# Patient Record
Sex: FEMALE | Race: OTHER | NOT HISPANIC OR LATINO | URBAN - METROPOLITAN AREA
[De-identification: names, ages, dates, MRNs, and addresses within clinical notes are randomized per-mention and may not be internally consistent; named-entity substitution may affect disease eponyms.]

---

## 2017-04-18 ENCOUNTER — EMERGENCY (EMERGENCY)
Facility: HOSPITAL | Age: 42
LOS: 0 days | Discharge: HOME | End: 2017-04-19
Attending: EMERGENCY MEDICINE | Admitting: EMERGENCY MEDICINE
Payer: MEDICAID

## 2017-04-18 VITALS — WEIGHT: 190.04 LBS | HEIGHT: 64 IN

## 2017-04-18 LAB
APPEARANCE UR: (no result)
BASOPHILS # BLD AUTO: 0.1 K/UL — SIGNIFICANT CHANGE UP (ref 0–0.2)
BASOPHILS NFR BLD AUTO: 1 % — SIGNIFICANT CHANGE UP (ref 0–2)
BILIRUB UR-MCNC: NEGATIVE — SIGNIFICANT CHANGE UP
COLOR SPEC: YELLOW — SIGNIFICANT CHANGE UP
DIFF PNL FLD: (no result)
EOSINOPHIL # BLD AUTO: 0 K/UL — SIGNIFICANT CHANGE UP (ref 0–0.5)
EOSINOPHIL NFR BLD AUTO: 0.4 % — SIGNIFICANT CHANGE UP (ref 0–6)
GLUCOSE UR QL: NEGATIVE MG/DL — SIGNIFICANT CHANGE UP
HCT VFR BLD CALC: 37.7 % — SIGNIFICANT CHANGE UP (ref 34.5–45)
HGB BLD-MCNC: 11.5 G/DL — SIGNIFICANT CHANGE UP (ref 11.5–15.5)
KETONES UR-MCNC: (no result)
LEUKOCYTE ESTERASE UR-ACNC: (no result)
LYMPHOCYTES # BLD AUTO: 0.5 K/UL — LOW (ref 1–3.3)
LYMPHOCYTES # BLD AUTO: 5.5 % — LOW (ref 13–44)
MCHC RBC-ENTMCNC: 23.6 PG — LOW (ref 27–34)
MCHC RBC-ENTMCNC: 30.6 GM/DL — LOW (ref 32–36)
MCV RBC AUTO: 77.2 FL — LOW (ref 80–100)
MONOCYTES # BLD AUTO: 0.7 K/UL — SIGNIFICANT CHANGE UP (ref 0–0.9)
MONOCYTES NFR BLD AUTO: 8.1 % — SIGNIFICANT CHANGE UP (ref 2–14)
NEUTROPHILS # BLD AUTO: 7.4 K/UL — SIGNIFICANT CHANGE UP (ref 1.8–7.4)
NEUTROPHILS NFR BLD AUTO: 85.1 % — HIGH (ref 43–77)
NITRITE UR-MCNC: NEGATIVE — SIGNIFICANT CHANGE UP
PH UR: 6 — SIGNIFICANT CHANGE UP (ref 5–8)
PLATELET # BLD AUTO: 241 K/UL — SIGNIFICANT CHANGE UP (ref 150–400)
PROT UR-MCNC: 15 MG/DL
RBC # BLD: 4.88 M/UL — SIGNIFICANT CHANGE UP (ref 3.8–5.2)
RBC # FLD: 14.4 % — SIGNIFICANT CHANGE UP (ref 10.3–14.5)
SP GR SPEC: 1.02 — SIGNIFICANT CHANGE UP (ref 1.01–1.02)
UROBILINOGEN FLD QL: NEGATIVE MG/DL — SIGNIFICANT CHANGE UP
WBC # BLD: 8.7 K/UL — SIGNIFICANT CHANGE UP (ref 3.8–10.5)
WBC # FLD AUTO: 8.7 K/UL — SIGNIFICANT CHANGE UP (ref 3.8–10.5)

## 2017-04-18 PROCEDURE — 99284 EMERGENCY DEPT VISIT MOD MDM: CPT

## 2017-04-18 RX ORDER — ONDANSETRON 8 MG/1
4 TABLET, FILM COATED ORAL ONCE
Qty: 0 | Refills: 0 | Status: COMPLETED | OUTPATIENT
Start: 2017-04-18 | End: 2017-04-18

## 2017-04-18 RX ORDER — SODIUM CHLORIDE 9 MG/ML
1000 INJECTION INTRAMUSCULAR; INTRAVENOUS; SUBCUTANEOUS ONCE
Qty: 0 | Refills: 0 | Status: COMPLETED | OUTPATIENT
Start: 2017-04-18 | End: 2017-04-18

## 2017-04-18 RX ADMIN — Medication 10 MILLIGRAM(S): at 23:57

## 2017-04-18 RX ADMIN — ONDANSETRON 4 MILLIGRAM(S): 8 TABLET, FILM COATED ORAL at 23:57

## 2017-04-18 RX ADMIN — SODIUM CHLORIDE 3000 MILLILITER(S): 9 INJECTION INTRAMUSCULAR; INTRAVENOUS; SUBCUTANEOUS at 23:34

## 2017-04-18 NOTE — ED ADULT NURSE NOTE - CHIEF COMPLAINT QUOTE
epigastric pain associated with nausea, vomiting x5 times today. Denies cp,sob,ha,dz,diarrhea or urinary sx.

## 2017-04-18 NOTE — ED ADULT TRIAGE NOTE - CHIEF COMPLAINT QUOTE
epigastric pain associated with nausea, vomiting x5 times today epigastric pain associated with nausea, vomiting x5 times today. Denies cp,sob,ha,dz,diarrhea or urinary sx.

## 2017-04-18 NOTE — ED PROVIDER NOTE - OBJECTIVE STATEMENT
42 y/o female with no PMHx, PSHx of cholecystectomy presents to the ED c/o non-bilious vomiting and non-bloody diarrhea starting at 5pm today. +subjective fever. Also c/o HA and abd pain. Denies dysuria, vaginal discharge or bleeding. Denies neck pain. No chest pain or SOB. No urinary f/u/d. No back pain. No motor or sensory deficits. Denies drug use. No recent travel. No rash. No other acute issues symptoms or concerns.

## 2017-04-18 NOTE — ED PROVIDER NOTE - NS ED MD SCRIBE ATTENDING SCRIBE SECTIONS
HISTORY OF PRESENT ILLNESS/PAST MEDICAL/SURGICAL/SOCIAL HISTORY/PROGRESS NOTE/PHYSICAL EXAM/VITAL SIGNS( Pullset)/DISPOSITION/REVIEW OF SYSTEMS/RESULTS

## 2017-04-18 NOTE — ED PROVIDER NOTE - MEDICAL DECISION MAKING DETAILS
feeling much better appy talk given return for intractable abd pain fevers or unable tolerate po fluids pt and pts  agree to plan of care

## 2017-04-19 VITALS
SYSTOLIC BLOOD PRESSURE: 106 MMHG | HEART RATE: 76 BPM | RESPIRATION RATE: 18 BRPM | OXYGEN SATURATION: 100 % | DIASTOLIC BLOOD PRESSURE: 60 MMHG

## 2017-04-19 LAB
ALBUMIN SERPL ELPH-MCNC: 4 G/DL — SIGNIFICANT CHANGE UP (ref 3.3–5)
ALP SERPL-CCNC: 103 U/L — SIGNIFICANT CHANGE UP (ref 40–120)
ALT FLD-CCNC: 42 U/L — SIGNIFICANT CHANGE UP (ref 12–78)
ANION GAP SERPL CALC-SCNC: 9 MMOL/L — SIGNIFICANT CHANGE UP (ref 5–17)
AST SERPL-CCNC: 30 U/L — SIGNIFICANT CHANGE UP (ref 15–37)
BILIRUB SERPL-MCNC: 0.5 MG/DL — SIGNIFICANT CHANGE UP (ref 0.2–1.2)
BUN SERPL-MCNC: 10 MG/DL — SIGNIFICANT CHANGE UP (ref 7–23)
CALCIUM SERPL-MCNC: 8.6 MG/DL — SIGNIFICANT CHANGE UP (ref 8.5–10.1)
CHLORIDE SERPL-SCNC: 103 MMOL/L — SIGNIFICANT CHANGE UP (ref 96–108)
CO2 SERPL-SCNC: 24 MMOL/L — SIGNIFICANT CHANGE UP (ref 22–31)
CREAT SERPL-MCNC: 0.7 MG/DL — SIGNIFICANT CHANGE UP (ref 0.5–1.3)
GLUCOSE SERPL-MCNC: 136 MG/DL — HIGH (ref 70–99)
LIDOCAIN IGE QN: 148 U/L — SIGNIFICANT CHANGE UP (ref 73–393)
POTASSIUM SERPL-MCNC: 3.7 MMOL/L — SIGNIFICANT CHANGE UP (ref 3.5–5.3)
POTASSIUM SERPL-SCNC: 3.7 MMOL/L — SIGNIFICANT CHANGE UP (ref 3.5–5.3)
PROT SERPL-MCNC: 8.5 GM/DL — HIGH (ref 6–8.3)
SODIUM SERPL-SCNC: 136 MMOL/L — SIGNIFICANT CHANGE UP (ref 135–145)

## 2017-04-19 NOTE — ED ADULT NURSE REASSESSMENT NOTE - NS ED NURSE REASSESS COMMENT FT1
POC PREGNANCY NEGATIVE TEST #1010
pt reports improvement to abd pain, rates abd pain upon discharge 3/10.

## 2017-04-20 DIAGNOSIS — R11.10 VOMITING, UNSPECIFIED: ICD-10-CM

## 2017-04-20 DIAGNOSIS — R50.9 FEVER, UNSPECIFIED: ICD-10-CM

## 2017-04-20 DIAGNOSIS — R10.9 UNSPECIFIED ABDOMINAL PAIN: ICD-10-CM

## 2017-10-08 ENCOUNTER — EMERGENCY (EMERGENCY)
Facility: HOSPITAL | Age: 42
LOS: 0 days | Discharge: HOME | End: 2017-10-09
Attending: EMERGENCY MEDICINE | Admitting: EMERGENCY MEDICINE
Payer: MEDICAID

## 2017-10-08 VITALS — WEIGHT: 199.96 LBS | HEIGHT: 64 IN

## 2017-10-08 DIAGNOSIS — K59.00 CONSTIPATION, UNSPECIFIED: ICD-10-CM

## 2017-10-08 DIAGNOSIS — R07.81 PLEURODYNIA: ICD-10-CM

## 2017-10-08 DIAGNOSIS — R10.12 LEFT UPPER QUADRANT PAIN: ICD-10-CM

## 2017-10-08 LAB
APPEARANCE UR: CLEAR — SIGNIFICANT CHANGE UP
BACTERIA # UR AUTO: (no result)
BILIRUB UR-MCNC: NEGATIVE — SIGNIFICANT CHANGE UP
COLOR SPEC: YELLOW — SIGNIFICANT CHANGE UP
DIFF PNL FLD: (no result)
EPI CELLS # UR: SIGNIFICANT CHANGE UP
GLUCOSE UR QL: NEGATIVE MG/DL — SIGNIFICANT CHANGE UP
HCT VFR BLD CALC: 33.8 % — LOW (ref 34.5–45)
HGB BLD-MCNC: 10.8 G/DL — LOW (ref 11.5–15.5)
KETONES UR-MCNC: NEGATIVE — SIGNIFICANT CHANGE UP
LEUKOCYTE ESTERASE UR-ACNC: (no result)
MCHC RBC-ENTMCNC: 24.2 PG — LOW (ref 27–34)
MCHC RBC-ENTMCNC: 32.1 GM/DL — SIGNIFICANT CHANGE UP (ref 32–36)
MCV RBC AUTO: 75.4 FL — LOW (ref 80–100)
NITRITE UR-MCNC: NEGATIVE — SIGNIFICANT CHANGE UP
PH UR: 5 — SIGNIFICANT CHANGE UP (ref 5–8)
PLATELET # BLD AUTO: 238 K/UL — SIGNIFICANT CHANGE UP (ref 150–400)
PROT UR-MCNC: NEGATIVE MG/DL — SIGNIFICANT CHANGE UP
RBC # BLD: 4.48 M/UL — SIGNIFICANT CHANGE UP (ref 3.8–5.2)
RBC # FLD: 14.5 % — SIGNIFICANT CHANGE UP (ref 10.3–14.5)
RBC CASTS # UR COMP ASSIST: NEGATIVE /HPF — SIGNIFICANT CHANGE UP (ref 0–4)
SP GR SPEC: 1.02 — SIGNIFICANT CHANGE UP (ref 1.01–1.02)
UROBILINOGEN FLD QL: NEGATIVE MG/DL — SIGNIFICANT CHANGE UP
WBC # BLD: 9.8 K/UL — SIGNIFICANT CHANGE UP (ref 3.8–10.5)
WBC # FLD AUTO: 9.8 K/UL — SIGNIFICANT CHANGE UP (ref 3.8–10.5)
WBC UR QL: SIGNIFICANT CHANGE UP

## 2017-10-08 PROCEDURE — 99284 EMERGENCY DEPT VISIT MOD MDM: CPT

## 2017-10-08 RX ORDER — KETOROLAC TROMETHAMINE 30 MG/ML
30 SYRINGE (ML) INJECTION ONCE
Qty: 0 | Refills: 0 | Status: DISCONTINUED | OUTPATIENT
Start: 2017-10-08 | End: 2017-10-08

## 2017-10-08 RX ORDER — SODIUM CHLORIDE 9 MG/ML
1000 INJECTION INTRAMUSCULAR; INTRAVENOUS; SUBCUTANEOUS ONCE
Qty: 0 | Refills: 0 | Status: COMPLETED | OUTPATIENT
Start: 2017-10-08 | End: 2017-10-08

## 2017-10-08 RX ORDER — SODIUM CHLORIDE 9 MG/ML
3 INJECTION INTRAMUSCULAR; INTRAVENOUS; SUBCUTANEOUS ONCE
Qty: 0 | Refills: 0 | Status: COMPLETED | OUTPATIENT
Start: 2017-10-08 | End: 2017-10-08

## 2017-10-08 RX ADMIN — SODIUM CHLORIDE 1000 MILLILITER(S): 9 INJECTION INTRAMUSCULAR; INTRAVENOUS; SUBCUTANEOUS at 23:20

## 2017-10-08 RX ADMIN — SODIUM CHLORIDE 3 MILLILITER(S): 9 INJECTION INTRAMUSCULAR; INTRAVENOUS; SUBCUTANEOUS at 23:40

## 2017-10-08 RX ADMIN — Medication 30 MILLIGRAM(S): at 23:25

## 2017-10-08 NOTE — ED ADULT NURSE NOTE - OBJECTIVE STATEMENT
Pt presents to ER c/o left sided rib/flank pain. Pt reports onset of symptoms was a few days ago, denies trauma/fall/long travel, denies CP/SOB. Skin on left flank is intact and non ecchymotic. Pt reports urine has been darker lately, denies blood in urine, denies painful urination. AO x 3 oriented to baseline, normal breathing pattern with no difficulty.

## 2017-10-08 NOTE — ED PROVIDER NOTE - MEDICAL DECISION MAKING DETAILS
Labs, UA and pain control with toradol planned.  If blood in urine, consider CT to r/o kidney stone.  If elevated WBC and evidence of UTI, will treat with antibiotics for upper urinary tract infection.

## 2017-10-08 NOTE — ED PROVIDER NOTE - OBJECTIVE STATEMENT
Pt. is a 41 yo F BIB family for left flank pain and LUQ abdominal pain X 2-3 days with worsened pain this evening.  Pt. states she feels like she has pain under left rib cage in LUQ of abdomen which radiates to left flank and lower abdomen.  States her urine is darker, but she denies dysuria or hematuria.  No injury or falls.  No heavy lifting.  Has had kidney infections in the past and has had a "flare up of her appendix" that went away with antibiotics.  Pt. states on chronic basis she intermittently gets RLQ abdominal pain that resolves on its own.  At this time, pt. denies any right sided abdominal pain, diarrhea, or fever.  No cough or trouble breathing.  +constipation

## 2017-10-09 VITALS
HEART RATE: 78 BPM | TEMPERATURE: 98 F | DIASTOLIC BLOOD PRESSURE: 87 MMHG | SYSTOLIC BLOOD PRESSURE: 121 MMHG | OXYGEN SATURATION: 100 % | RESPIRATION RATE: 17 BRPM

## 2017-10-09 LAB
ALBUMIN SERPL ELPH-MCNC: 3.4 G/DL — SIGNIFICANT CHANGE UP (ref 3.3–5)
ALP SERPL-CCNC: 102 U/L — SIGNIFICANT CHANGE UP (ref 40–120)
ALT FLD-CCNC: 27 U/L — SIGNIFICANT CHANGE UP (ref 12–78)
ANION GAP SERPL CALC-SCNC: 5 MMOL/L — SIGNIFICANT CHANGE UP (ref 5–17)
ANISOCYTOSIS BLD QL: SLIGHT — SIGNIFICANT CHANGE UP
AST SERPL-CCNC: 18 U/L — SIGNIFICANT CHANGE UP (ref 15–37)
BASOPHILS # BLD AUTO: 0.1 K/UL — SIGNIFICANT CHANGE UP (ref 0–0.2)
BASOPHILS NFR BLD AUTO: 1 % — SIGNIFICANT CHANGE UP (ref 0–2)
BILIRUB SERPL-MCNC: 0.2 MG/DL — SIGNIFICANT CHANGE UP (ref 0.2–1.2)
BUN SERPL-MCNC: 10 MG/DL — SIGNIFICANT CHANGE UP (ref 7–23)
CALCIUM SERPL-MCNC: 8.8 MG/DL — SIGNIFICANT CHANGE UP (ref 8.5–10.1)
CHLORIDE SERPL-SCNC: 105 MMOL/L — SIGNIFICANT CHANGE UP (ref 96–108)
CO2 SERPL-SCNC: 28 MMOL/L — SIGNIFICANT CHANGE UP (ref 22–31)
CREAT SERPL-MCNC: 0.66 MG/DL — SIGNIFICANT CHANGE UP (ref 0.5–1.3)
ELLIPTOCYTES BLD QL SMEAR: SLIGHT — SIGNIFICANT CHANGE UP
EOSINOPHIL # BLD AUTO: 0.3 K/UL — SIGNIFICANT CHANGE UP (ref 0–0.5)
EOSINOPHIL NFR BLD AUTO: 2.7 % — SIGNIFICANT CHANGE UP (ref 0–6)
GLUCOSE SERPL-MCNC: 104 MG/DL — HIGH (ref 70–99)
HYPOCHROMIA BLD QL: SLIGHT — SIGNIFICANT CHANGE UP
LIDOCAIN IGE QN: 149 U/L — SIGNIFICANT CHANGE UP (ref 73–393)
LYMPHOCYTES # BLD AUTO: 3 K/UL — SIGNIFICANT CHANGE UP (ref 1–3.3)
LYMPHOCYTES # BLD AUTO: 30.5 % — SIGNIFICANT CHANGE UP (ref 13–44)
MANUAL DIF COMMENT BLD-IMP: SIGNIFICANT CHANGE UP
MICROCYTES BLD QL: SLIGHT — SIGNIFICANT CHANGE UP
MONOCYTES # BLD AUTO: 0.7 K/UL — SIGNIFICANT CHANGE UP (ref 0–0.9)
MONOCYTES NFR BLD AUTO: 7.2 % — SIGNIFICANT CHANGE UP (ref 2–14)
NEUTROPHILS # BLD AUTO: 5.7 K/UL — SIGNIFICANT CHANGE UP (ref 1.8–7.4)
NEUTROPHILS NFR BLD AUTO: 58.7 % — SIGNIFICANT CHANGE UP (ref 43–77)
PLAT MORPH BLD: NORMAL — SIGNIFICANT CHANGE UP
POIKILOCYTOSIS BLD QL AUTO: SLIGHT — SIGNIFICANT CHANGE UP
POTASSIUM SERPL-MCNC: 3.7 MMOL/L — SIGNIFICANT CHANGE UP (ref 3.5–5.3)
POTASSIUM SERPL-SCNC: 3.7 MMOL/L — SIGNIFICANT CHANGE UP (ref 3.5–5.3)
PROT SERPL-MCNC: 7.4 GM/DL — SIGNIFICANT CHANGE UP (ref 6–8.3)
RBC BLD AUTO: (no result)
SODIUM SERPL-SCNC: 138 MMOL/L — SIGNIFICANT CHANGE UP (ref 135–145)

## 2017-10-09 PROCEDURE — 74177 CT ABD & PELVIS W/CONTRAST: CPT | Mod: 26

## 2017-10-09 NOTE — ED ADULT NURSE REASSESSMENT NOTE - NS ED NURSE REASSESS COMMENT FT1
Pt reports partial pain/pressure relief in left flank, resting comfortable in bed, head of bed elevated. Pt updated on plan of care.

## 2017-10-10 LAB
CULTURE RESULTS: SIGNIFICANT CHANGE UP
SPECIMEN SOURCE: SIGNIFICANT CHANGE UP

## 2018-10-22 ENCOUNTER — EMERGENCY (EMERGENCY)
Facility: HOSPITAL | Age: 43
LOS: 0 days | Discharge: HOME | End: 2018-10-22
Attending: STUDENT IN AN ORGANIZED HEALTH CARE EDUCATION/TRAINING PROGRAM | Admitting: STUDENT IN AN ORGANIZED HEALTH CARE EDUCATION/TRAINING PROGRAM
Payer: MEDICAID

## 2018-10-22 VITALS
DIASTOLIC BLOOD PRESSURE: 83 MMHG | HEART RATE: 70 BPM | SYSTOLIC BLOOD PRESSURE: 135 MMHG | RESPIRATION RATE: 16 BRPM | OXYGEN SATURATION: 100 % | TEMPERATURE: 98 F

## 2018-10-22 VITALS — WEIGHT: 192.9 LBS | HEIGHT: 61 IN

## 2018-10-22 LAB
ANION GAP SERPL CALC-SCNC: 7 MMOL/L — SIGNIFICANT CHANGE UP (ref 5–17)
BASOPHILS # BLD AUTO: 0.03 K/UL — SIGNIFICANT CHANGE UP (ref 0–0.2)
BASOPHILS NFR BLD AUTO: 0.3 % — SIGNIFICANT CHANGE UP (ref 0–2)
BUN SERPL-MCNC: 14 MG/DL — SIGNIFICANT CHANGE UP (ref 7–23)
CALCIUM SERPL-MCNC: 8.4 MG/DL — LOW (ref 8.5–10.1)
CHLORIDE SERPL-SCNC: 103 MMOL/L — SIGNIFICANT CHANGE UP (ref 96–108)
CO2 SERPL-SCNC: 26 MMOL/L — SIGNIFICANT CHANGE UP (ref 22–31)
CREAT SERPL-MCNC: 0.77 MG/DL — SIGNIFICANT CHANGE UP (ref 0.5–1.3)
EOSINOPHIL # BLD AUTO: 0.2 K/UL — SIGNIFICANT CHANGE UP (ref 0–0.5)
EOSINOPHIL NFR BLD AUTO: 2.2 % — SIGNIFICANT CHANGE UP (ref 0–6)
GLUCOSE SERPL-MCNC: 104 MG/DL — HIGH (ref 70–99)
HCT VFR BLD CALC: 32.6 % — LOW (ref 34.5–45)
HGB BLD-MCNC: 10 G/DL — LOW (ref 11.5–15.5)
IMM GRANULOCYTES NFR BLD AUTO: 0.3 % — SIGNIFICANT CHANGE UP (ref 0–1.5)
LYMPHOCYTES # BLD AUTO: 2.62 K/UL — SIGNIFICANT CHANGE UP (ref 1–3.3)
LYMPHOCYTES # BLD AUTO: 29 % — SIGNIFICANT CHANGE UP (ref 13–44)
MCHC RBC-ENTMCNC: 23.6 PG — LOW (ref 27–34)
MCHC RBC-ENTMCNC: 30.7 GM/DL — LOW (ref 32–36)
MCV RBC AUTO: 77.1 FL — LOW (ref 80–100)
MONOCYTES # BLD AUTO: 0.63 K/UL — SIGNIFICANT CHANGE UP (ref 0–0.9)
MONOCYTES NFR BLD AUTO: 7 % — SIGNIFICANT CHANGE UP (ref 2–14)
NEUTROPHILS # BLD AUTO: 5.53 K/UL — SIGNIFICANT CHANGE UP (ref 1.8–7.4)
NEUTROPHILS NFR BLD AUTO: 61.2 % — SIGNIFICANT CHANGE UP (ref 43–77)
NRBC # BLD: 0 /100 WBCS — SIGNIFICANT CHANGE UP (ref 0–0)
PLATELET # BLD AUTO: 273 K/UL — SIGNIFICANT CHANGE UP (ref 150–400)
POTASSIUM SERPL-MCNC: 3.5 MMOL/L — SIGNIFICANT CHANGE UP (ref 3.5–5.3)
POTASSIUM SERPL-SCNC: 3.5 MMOL/L — SIGNIFICANT CHANGE UP (ref 3.5–5.3)
RBC # BLD: 4.23 M/UL — SIGNIFICANT CHANGE UP (ref 3.8–5.2)
RBC # FLD: 15.8 % — HIGH (ref 10.3–14.5)
SODIUM SERPL-SCNC: 136 MMOL/L — SIGNIFICANT CHANGE UP (ref 135–145)
WBC # BLD: 9.04 K/UL — SIGNIFICANT CHANGE UP (ref 3.8–10.5)
WBC # FLD AUTO: 9.04 K/UL — SIGNIFICANT CHANGE UP (ref 3.8–10.5)

## 2018-10-22 PROCEDURE — 99283 EMERGENCY DEPT VISIT LOW MDM: CPT

## 2018-10-22 RX ORDER — ACETAMINOPHEN 500 MG
650 TABLET ORAL ONCE
Qty: 0 | Refills: 0 | Status: COMPLETED | OUTPATIENT
Start: 2018-10-22 | End: 2018-10-22

## 2018-10-22 RX ADMIN — Medication 650 MILLIGRAM(S): at 19:09

## 2018-10-22 NOTE — ED ADULT NURSE NOTE - NSIMPLEMENTINTERV_GEN_ALL_ED
Implemented All Universal Safety Interventions:  [place] to call system. Call bell, personal items and telephone within reach. Instruct patient to call for assistance. Room bathroom lighting operational. Non-slip footwear when patient is off stretcher. Physically safe environment: no spills, clutter or unnecessary equipment. Stretcher in lowest position, wheels locked, appropriate side rails in place.

## 2018-10-22 NOTE — ED STATDOCS - PROGRESS NOTE DETAILS
43 year old female, denies pmhx, here for bilateral hand pain, itching, dryness x 8 days. Patient works as a , states she uses chemicals and  everyday. She denies redness/ bleeding/ open wounds. PE: Bilateral hands appear mildly dry, no redness, warmth, or sign of infection  IMP: Dry hands/ Chemical exposure? Labs reviewed and unremarkable.  Advised avoiding contact with chemicals, OTC hand cream and pain control, Follow up PMD  Dwaine Rosenthal PA-C

## 2018-10-22 NOTE — ED STATDOCS - OBJECTIVE STATEMENT
44 y/o female with a PMHx of pyelonephritis presents to the ED c/o bilateral hand pain, numbness and itchiness x8 days. Pt reports she is experiencing the sx in all digits as well. Pt took 2 tablets of Ibuprofen today for the pain without relief. Pt works as a . No PCP.  #[id number]

## 2018-10-22 NOTE — ED STATDOCS - NEUROLOGICAL, MLM
sensation is normal and strength is normal. negative phalen's test,  ? positive tinel's test sensation is normal and strength is normal. ? positive tinel's test

## 2018-10-22 NOTE — ED ADULT TRIAGE NOTE - CHIEF COMPLAINT QUOTE
c/o bilateral hand pain, numbness and tingling for past 8 days, pt states she works as a  HX: denies

## 2018-10-22 NOTE — ED STATDOCS - ATTENDING CONTRIBUTION TO CARE
I, Frances Lopez DO,  performed the initial face to face bedside interview with this patient regarding history of present illness, review of symptoms and relevant past medical, social and family history.  I completed an independent physical examination.  I was the initial provider who evaluated this patient. I have signed out the follow up of any pending tests (i.e. labs, radiological studies) to the ACP.  I have communicated the patient’s plan of care and disposition with the ACP.  The history, relevant review of systems, past medical and surgical history, medical decision making, and physical examination was documented by the scribe in my presence and I attest to the accuracy of the documentation.

## 2018-10-22 NOTE — ED STATDOCS - MUSCULOSKELETAL, MLM
range of motion is not limited and there is no muscle tenderness. range of motion is not limited and there is no muscle tenderness. negative phalen's test

## 2018-10-23 PROBLEM — N12 TUBULO-INTERSTITIAL NEPHRITIS, NOT SPECIFIED AS ACUTE OR CHRONIC: Chronic | Status: ACTIVE | Noted: 2017-10-08

## 2018-10-24 DIAGNOSIS — M79.642 PAIN IN LEFT HAND: ICD-10-CM

## 2018-10-24 DIAGNOSIS — R20.0 ANESTHESIA OF SKIN: ICD-10-CM

## 2018-10-24 DIAGNOSIS — M79.641 PAIN IN RIGHT HAND: ICD-10-CM

## 2018-10-24 DIAGNOSIS — Z87.19 PERSONAL HISTORY OF OTHER DISEASES OF THE DIGESTIVE SYSTEM: ICD-10-CM

## 2020-04-12 NOTE — ED ADULT NURSE NOTE - PATIENT DISCHARGE SIGNATURE
0800 Received call from Raissa Chiang at [facility], states they are waiting for their medical director to approve patient for hospice, will return call when they have decision. 1045 Received call from Raissa Chiang at [facility], they have approval for patient. Transportation requested per [facility], state they will be here in 15 minutes to pick patient up. Updated family at bedside and pt's RN of  time. Rn given number to call report [phone]. Faxed hospice order to Raissa Chiang at [facility] and notified her of  time. 22-Oct-2018

## 2020-11-30 ENCOUNTER — EMERGENCY (EMERGENCY)
Facility: HOSPITAL | Age: 45
LOS: 0 days | Discharge: HOME | End: 2020-11-30
Attending: EMERGENCY MEDICINE
Payer: MEDICAID

## 2020-11-30 VITALS
DIASTOLIC BLOOD PRESSURE: 52 MMHG | TEMPERATURE: 99 F | HEART RATE: 78 BPM | OXYGEN SATURATION: 100 % | RESPIRATION RATE: 19 BRPM | SYSTOLIC BLOOD PRESSURE: 113 MMHG

## 2020-11-30 VITALS — HEIGHT: 61 IN | WEIGHT: 160.06 LBS

## 2020-11-30 DIAGNOSIS — Z87.440 PERSONAL HISTORY OF URINARY (TRACT) INFECTIONS: ICD-10-CM

## 2020-11-30 DIAGNOSIS — R10.9 UNSPECIFIED ABDOMINAL PAIN: ICD-10-CM

## 2020-11-30 LAB
ALBUMIN SERPL ELPH-MCNC: 3.3 G/DL — SIGNIFICANT CHANGE UP (ref 3.3–5)
ALP SERPL-CCNC: 68 U/L — SIGNIFICANT CHANGE UP (ref 40–120)
ALT FLD-CCNC: 19 U/L — SIGNIFICANT CHANGE UP (ref 12–78)
ANION GAP SERPL CALC-SCNC: 3 MMOL/L — LOW (ref 5–17)
APPEARANCE UR: ABNORMAL
AST SERPL-CCNC: 16 U/L — SIGNIFICANT CHANGE UP (ref 15–37)
BASOPHILS # BLD AUTO: 0.03 K/UL — SIGNIFICANT CHANGE UP (ref 0–0.2)
BASOPHILS NFR BLD AUTO: 0.6 % — SIGNIFICANT CHANGE UP (ref 0–2)
BILIRUB SERPL-MCNC: 0.3 MG/DL — SIGNIFICANT CHANGE UP (ref 0.2–1.2)
BILIRUB UR-MCNC: NEGATIVE — SIGNIFICANT CHANGE UP
BUN SERPL-MCNC: 6 MG/DL — LOW (ref 7–23)
CALCIUM SERPL-MCNC: 8.9 MG/DL — SIGNIFICANT CHANGE UP (ref 8.5–10.1)
CHLORIDE SERPL-SCNC: 108 MMOL/L — SIGNIFICANT CHANGE UP (ref 96–108)
CO2 SERPL-SCNC: 27 MMOL/L — SIGNIFICANT CHANGE UP (ref 22–31)
COLOR SPEC: YELLOW — SIGNIFICANT CHANGE UP
CREAT SERPL-MCNC: 0.53 MG/DL — SIGNIFICANT CHANGE UP (ref 0.5–1.3)
DIFF PNL FLD: ABNORMAL
EOSINOPHIL # BLD AUTO: 0.1 K/UL — SIGNIFICANT CHANGE UP (ref 0–0.5)
EOSINOPHIL NFR BLD AUTO: 2 % — SIGNIFICANT CHANGE UP (ref 0–6)
GLUCOSE SERPL-MCNC: 91 MG/DL — SIGNIFICANT CHANGE UP (ref 70–99)
GLUCOSE UR QL: NEGATIVE MG/DL — SIGNIFICANT CHANGE UP
HCT VFR BLD CALC: 30.6 % — LOW (ref 34.5–45)
HGB BLD-MCNC: 8.8 G/DL — LOW (ref 11.5–15.5)
IMM GRANULOCYTES NFR BLD AUTO: 0.4 % — SIGNIFICANT CHANGE UP (ref 0–1.5)
KETONES UR-MCNC: ABNORMAL
LEUKOCYTE ESTERASE UR-ACNC: ABNORMAL
LIDOCAIN IGE QN: 106 U/L — SIGNIFICANT CHANGE UP (ref 73–393)
LYMPHOCYTES # BLD AUTO: 0.99 K/UL — LOW (ref 1–3.3)
LYMPHOCYTES # BLD AUTO: 20.2 % — SIGNIFICANT CHANGE UP (ref 13–44)
MCHC RBC-ENTMCNC: 21.1 PG — LOW (ref 27–34)
MCHC RBC-ENTMCNC: 28.8 GM/DL — LOW (ref 32–36)
MCV RBC AUTO: 73.2 FL — LOW (ref 80–100)
MONOCYTES # BLD AUTO: 0.62 K/UL — SIGNIFICANT CHANGE UP (ref 0–0.9)
MONOCYTES NFR BLD AUTO: 12.6 % — SIGNIFICANT CHANGE UP (ref 2–14)
NEUTROPHILS # BLD AUTO: 3.15 K/UL — SIGNIFICANT CHANGE UP (ref 1.8–7.4)
NEUTROPHILS NFR BLD AUTO: 64.2 % — SIGNIFICANT CHANGE UP (ref 43–77)
NITRITE UR-MCNC: NEGATIVE — SIGNIFICANT CHANGE UP
PH UR: 6 — SIGNIFICANT CHANGE UP (ref 5–8)
PLATELET # BLD AUTO: 235 K/UL — SIGNIFICANT CHANGE UP (ref 150–400)
POTASSIUM SERPL-MCNC: 4 MMOL/L — SIGNIFICANT CHANGE UP (ref 3.5–5.3)
POTASSIUM SERPL-SCNC: 4 MMOL/L — SIGNIFICANT CHANGE UP (ref 3.5–5.3)
PROT SERPL-MCNC: 7.6 GM/DL — SIGNIFICANT CHANGE UP (ref 6–8.3)
PROT UR-MCNC: 30 MG/DL
RBC # BLD: 4.18 M/UL — SIGNIFICANT CHANGE UP (ref 3.8–5.2)
RBC # FLD: 17.2 % — HIGH (ref 10.3–14.5)
SODIUM SERPL-SCNC: 138 MMOL/L — SIGNIFICANT CHANGE UP (ref 135–145)
SP GR SPEC: 1.02 — SIGNIFICANT CHANGE UP (ref 1.01–1.02)
UROBILINOGEN FLD QL: 1 MG/DL
WBC # BLD: 4.91 K/UL — SIGNIFICANT CHANGE UP (ref 3.8–10.5)
WBC # FLD AUTO: 4.91 K/UL — SIGNIFICANT CHANGE UP (ref 3.8–10.5)

## 2020-11-30 PROCEDURE — 99284 EMERGENCY DEPT VISIT MOD MDM: CPT

## 2020-11-30 PROCEDURE — 36415 COLL VENOUS BLD VENIPUNCTURE: CPT

## 2020-11-30 PROCEDURE — 74176 CT ABD & PELVIS W/O CONTRAST: CPT

## 2020-11-30 PROCEDURE — 85025 COMPLETE CBC W/AUTO DIFF WBC: CPT

## 2020-11-30 PROCEDURE — 83690 ASSAY OF LIPASE: CPT

## 2020-11-30 PROCEDURE — 99284 EMERGENCY DEPT VISIT MOD MDM: CPT | Mod: 25

## 2020-11-30 PROCEDURE — 96374 THER/PROPH/DIAG INJ IV PUSH: CPT

## 2020-11-30 PROCEDURE — 87086 URINE CULTURE/COLONY COUNT: CPT

## 2020-11-30 PROCEDURE — 87186 SC STD MICRODIL/AGAR DIL: CPT

## 2020-11-30 PROCEDURE — 74176 CT ABD & PELVIS W/O CONTRAST: CPT | Mod: 26

## 2020-11-30 PROCEDURE — 80053 COMPREHEN METABOLIC PANEL: CPT

## 2020-11-30 PROCEDURE — 81001 URINALYSIS AUTO W/SCOPE: CPT

## 2020-11-30 RX ORDER — CEFTRIAXONE 500 MG/1
1000 INJECTION, POWDER, FOR SOLUTION INTRAMUSCULAR; INTRAVENOUS ONCE
Refills: 0 | Status: DISCONTINUED | OUTPATIENT
Start: 2020-11-30 | End: 2020-11-30

## 2020-11-30 RX ORDER — ACETAMINOPHEN 500 MG
650 TABLET ORAL ONCE
Refills: 0 | Status: COMPLETED | OUTPATIENT
Start: 2020-11-30 | End: 2020-11-30

## 2020-11-30 RX ORDER — CEPHALEXIN 500 MG
1 CAPSULE ORAL
Qty: 42 | Refills: 0
Start: 2020-11-30 | End: 2020-12-13

## 2020-11-30 RX ORDER — SODIUM CHLORIDE 9 MG/ML
1000 INJECTION INTRAMUSCULAR; INTRAVENOUS; SUBCUTANEOUS ONCE
Refills: 0 | Status: COMPLETED | OUTPATIENT
Start: 2020-11-30 | End: 2020-11-30

## 2020-11-30 RX ORDER — CEFTRIAXONE 500 MG/1
1000 INJECTION, POWDER, FOR SOLUTION INTRAMUSCULAR; INTRAVENOUS ONCE
Refills: 0 | Status: COMPLETED | OUTPATIENT
Start: 2020-11-30 | End: 2020-11-30

## 2020-11-30 RX ADMIN — SODIUM CHLORIDE 1000 MILLILITER(S): 9 INJECTION INTRAMUSCULAR; INTRAVENOUS; SUBCUTANEOUS at 14:18

## 2020-11-30 RX ADMIN — Medication 650 MILLIGRAM(S): at 14:25

## 2020-11-30 RX ADMIN — CEFTRIAXONE 1000 MILLIGRAM(S): 500 INJECTION, POWDER, FOR SOLUTION INTRAMUSCULAR; INTRAVENOUS at 14:25

## 2020-11-30 RX ADMIN — SODIUM CHLORIDE 1000 MILLILITER(S): 9 INJECTION INTRAMUSCULAR; INTRAVENOUS; SUBCUTANEOUS at 13:52

## 2020-11-30 NOTE — ED STATDOCS - OBJECTIVE STATEMENT
44 y/o female with PMHx of Pyelonephritis presents to the ED c/o changes in urinary frequency and left flank pain since 11/28, worsening. Pt states she has now developed dysuria, states she has trouble urinating due to pain, and fever. Pt notes history of UTIs. Denies chance of pregnancy. No other complaints at this time.

## 2020-11-30 NOTE — ED STATDOCS - PROGRESS NOTE DETAILS
Using  [id number], 46 yo female with no significant PMH and PSH of b/l tubal ligation presents for L flank pain, frequency of urinary and dysuria since Saturday. Pt noticed sub fever at home and chills. Tylenol was taken for her symptoms. Pt states it feels similar to her past UTIs in the past. -Dwaine Alvarado PA-C Using  [id number], discussed the results with pt and advised her to f/u with her pmd for the anemia. Pt is aware and agrees to f/u. ALso discussed the UA and CT which showed signs of a UTI. Due to the L flank pain will treat with keflex for 2 weeks for possible pyleo. -Dwaine Alvarado PA-C

## 2020-11-30 NOTE — ED ADULT TRIAGE NOTE - PAIN RATING/NUMBER SCALE (0-10): ACTIVITY
Problem: Goal Outcome Summary  Goal: Goal Outcome Summary  Outcome: No Change  Infant stabilized on radiant warmer. Remains on CPAP peep of 6, FIO2 21-24%. Fussy on/off. Starter TPN infusing in UVC at 8ml/hr. Antibiotics initiated. UVC measures 6 at umbilicus. Some oozing noted under dressing....dressing replaced with new dressing, no new oozing noted. Infant has intermittent grunting. Voided, no stool.       8

## 2020-11-30 NOTE — ED ADULT TRIAGE NOTE - CHIEF COMPLAINT QUOTE
pt presents to ED with complaints of b/l flank pain radiating to front. pt states symptoms started yesterday. pt took tylenol last night with no relief.

## 2020-11-30 NOTE — ED STATDOCS - PATIENT PORTAL LINK FT
You can access the FollowMyHealth Patient Portal offered by [facility] by registering at the following website: [email or web address]. By joining [email or web address]’s FollowMyHealth portal, you will also be able to view your health information using other applications (apps) compatible with our system.

## 2020-11-30 NOTE — ED STATDOCS - GASTROINTESTINAL, MLM
abdomen soft, and non-distended. Bowel sounds present. +left abd tenderness, left suprapubic TTP, left CVAT, TTP.

## 2020-11-30 NOTE — ED ADULT NURSE NOTE - OBJECTIVE STATEMENT
Pt c/o BL flank pain with radiation to lower pelvis with burning with urination x days.  Pt c/o subjective fever at home, afebrile in ER.

## 2020-11-30 NOTE — ED STATDOCS - NSFOLLOWUPINSTRUCTIONS_ED_ALL_ED_FT
Infección urinaria en los adultos    Urinary Tract Infection, Adult    Tosha infección urinaria (IU) puede ocurrir en cualquier lugar de las vías urinarias. Las vías urinarias incluyen lo siguiente:  •Los riñones.      •Los uréteres.      •La vejiga.      •La uretra.      Estos órganos fabrican, almacenan y eliminan el pis (orina) del cuerpo.      ¿Cuáles son las causas?    La causa es la presencia de gérmenes (bacterias) en la freddy genital. Estos gérmenes proliferan y causan hinchazón (inflamación) de las vías urinarias.      ¿Qué incrementa el riesgo?  Es más probable que contraiga esta afección si:  •Tiene colocado un tubo lizarraga y pequeño (catéter) para drenar el pis.      •No puede controlar la evacuación de pis ni de materia fecal (incontinencia).    •Es oksana y, además:•Usa estos métodos para evitar el embarazo:  •Un medicamento que maza los espermatozoides (espermicida).      •Un dispositivo que impide el paso de los espermatozoides (diafragma).        •Tiene niveles bajos de tosha hormona femenina (estrógeno).      •Está embarazada.        •Tiene genes que aumentan banegas riesgo.      •Es sexualmente activa.      •Monika antibióticos.     •Tiene dificultad para orinar debido a:  •Banegas próstata es más jack de lo normal, si usted es hombre.      •Obstrucción en la parte del cuerpo que drena el pis de la vejiga (uretra).      •Cálculo renal.       •Un trastorno nervioso que afecta la vejiga (vejiga neurógena).      •No ariana tosha cantidad suficiente de líquido.       •No hace pis con la frecuencia suficiente.      •Tiene otras afecciones, lindsay:  •Diabetes.       •Un sistema que combate las enfermedades (sistema inmunitario) debilitado.      •Anemia drepanocítica.       •Gota.       •Lesión en la columna vertebral.          ¿Cuáles son los signos o los síntomas?  Los síntomas de esta afección incluyen:  •Necesidad inmediata (urgente) de hacer pis.      •Hacer pis con frecuencia.      •Hacer poca cantidad de pis con mucha frecuencia.      •Dolor o ardor al hacer pis.      •Shayne en el pis.      •Pis que huele mal o anormal.      •Dificultad para hacer pis.      •Pis turbio.      •Líquido que sale de la vagina, si es oksana.      •Dolor en la odette o en la parte baja de la espalda.    Otros síntomas pueden incluir los siguientes:  •Vómitos.      •No sentir deseos de comer.      •Sentirse confundido (confuso).      •Sentirse cansado y malhumorado (irritable).      •Fiebre.      •Materia fecal líquida (diarrea).        ¿Cómo se trata?  El tratamiento de esta afección puede incluir:  •Antibióticos.       •Otros medicamentos.      •Beber tosha cantidad suficiente de agua.        Sigue estas instrucciones en tu casa:     Medicamentos     •[place] los medicamentos de venta chitra y los recetados solamente lindsay se lo haya indicado el médico.      •Si le recetaron un antibiótico, tómelo lindsay se lo haya indicado el médico. No deje de tomarlo aunque comience a sentirse mejor.      Indicaciones generales   •Asegúrese de hacer lo siguiente:  •Khadra pis hasta que la vejiga quede vacía.       •No contenga el pis bushra mucho tiempo.      •Vacíe la vejiga después de tener relaciones sexuales.      •Límpiese de adelante hacia atrás después de defecar, si es oksana. Use cada trozo de papel higiénico solo tosha vez cuando se limpie.        •Patricia suficiente líquido lindsay para mantener la orina de color amarillo pálido.      •Concurra a todas las visitas de seguimiento lindsay se lo haya indicado el médico. [place] es importante.        Comuníquese con un médico si:    •No mejora después de 1 o 2 días de tratamiento.      •Los síntomas desaparecen y luego reaparecen.        Solicite ayuda inmediatamente si:    •Tiene un dolor muy intenso en la espalda.      •Tiene dolor muy intenso en la parte baja de la odette.      •Tener fiebre.      •Tiene malestar estomacal (náuseas).      •Tiene vómitos.        Resumen    •Tosha infección urinaria (IU) puede ocurrir en cualquier lugar de las vías urinarias.      •Esta afección es causada por la presencia de gérmenes en la freddy genital.      •Existen muchos factores de riesgo de sufrir tosha IU. Estos incluyen tener colocado un tubo lizarraga y pequeño para drenar el pis y no poder controlar cuándo hace pis y materia fecal.      •El tratamiento incluye antibióticos contra los gérmenes.      •Patricia suficiente líquido lindsay para mantener la orina de color amarillo pálido.      Esta información no tiene lindsay fin reemplazar el consejo del médico. Asegúrese de hacerle al médico cualquier pregunta que tenga.

## 2020-12-03 NOTE — ED POST DISCHARGE NOTE - RESULT SUMMARY
Urine culture + for E. coli 50K-99K CFU. Discharged home with keflex to which bacteria is sensitive. - ANASTASIA LarkinC

## 2021-01-01 NOTE — ED ADULT TRIAGE NOTE - CADM TRG TX PRIOR TO ARRIVAL
none [place] affected by asymmetric IUGR [place] affected by asymmetric IUGR [place] affected by asymmetric IUGR

## 2021-06-17 ENCOUNTER — APPOINTMENT (OUTPATIENT)
Dept: MAMMOGRAPHY | Facility: CLINIC | Age: 46
End: 2021-06-17
Payer: MEDICAID

## 2021-06-17 ENCOUNTER — OUTPATIENT (OUTPATIENT)
Dept: OUTPATIENT SERVICES | Facility: HOSPITAL | Age: 46
LOS: 1 days | End: 2021-06-17
Payer: MEDICAID

## 2021-06-17 PROCEDURE — 77063 BREAST TOMOSYNTHESIS BI: CPT

## 2021-06-17 PROCEDURE — 77063 BREAST TOMOSYNTHESIS BI: CPT | Mod: 26

## 2021-06-17 PROCEDURE — 77067 SCR MAMMO BI INCL CAD: CPT

## 2021-06-17 PROCEDURE — 77067 SCR MAMMO BI INCL CAD: CPT | Mod: 26

## 2021-08-04 ENCOUNTER — RESULT REVIEW (OUTPATIENT)
Age: 46
End: 2021-08-04

## 2021-08-10 ENCOUNTER — APPOINTMENT (OUTPATIENT)
Dept: ULTRASOUND IMAGING | Facility: CLINIC | Age: 46
End: 2021-08-10
Payer: MEDICAID

## 2021-08-10 ENCOUNTER — OUTPATIENT (OUTPATIENT)
Dept: OUTPATIENT SERVICES | Facility: HOSPITAL | Age: 46
LOS: 1 days | End: 2021-08-10
Payer: COMMERCIAL

## 2021-08-10 DIAGNOSIS — R10.2 PELVIC AND PERINEAL PAIN: ICD-10-CM

## 2021-08-10 DIAGNOSIS — Z01.411 ENCOUNTER FOR GYNECOLOGICAL EXAMINATION (GENERAL) (ROUTINE) WITH ABNORMAL FINDINGS: ICD-10-CM

## 2021-08-10 PROCEDURE — 76830 TRANSVAGINAL US NON-OB: CPT

## 2021-08-10 PROCEDURE — 76830 TRANSVAGINAL US NON-OB: CPT | Mod: 26

## 2021-08-10 PROCEDURE — 76856 US EXAM PELVIC COMPLETE: CPT

## 2021-08-10 PROCEDURE — 76856 US EXAM PELVIC COMPLETE: CPT | Mod: 26

## 2021-08-23 ENCOUNTER — APPOINTMENT (OUTPATIENT)
Dept: UROLOGY | Facility: CLINIC | Age: 46
End: 2021-08-23
Payer: MEDICAID

## 2021-08-23 VITALS
TEMPERATURE: 97.8 F | DIASTOLIC BLOOD PRESSURE: 78 MMHG | WEIGHT: 185.8 LBS | OXYGEN SATURATION: 98 % | HEART RATE: 75 BPM | HEIGHT: 61 IN | SYSTOLIC BLOOD PRESSURE: 113 MMHG | BODY MASS INDEX: 35.08 KG/M2

## 2021-08-23 PROCEDURE — 99203 OFFICE O/P NEW LOW 30 MIN: CPT

## 2021-08-23 NOTE — HISTORY OF PRESENT ILLNESS
[FreeTextEntry1] : This patient presents for the evaluation of urinary urgency incontinence noted over the past year.  She notes this seems to be worsening somewhat.

## 2021-08-23 NOTE — REVIEW OF SYSTEMS
[Feeling Tired] : feeling tired [Dry Eyes] : dryness of the eyes [Eyes Itch] : itching of the eyes [denies] : denies pain with orgasm [see HPI] : see HPI [Negative] : Eyes

## 2021-08-23 NOTE — END OF VISIT
[FreeTextEntry3] : She will trial anticholinergic medications and urine is sent for culture analysis and cytology.  She will follow-up with a sonogram of the kidneys and bladder and cystometric's and cystoscopy with further plans to follow

## 2021-08-24 LAB
APPEARANCE: CLEAR
BACTERIA: NEGATIVE
BILIRUBIN URINE: NEGATIVE
BLOOD URINE: NEGATIVE
CALCIUM OXALATE CRYSTALS: ABNORMAL
COLOR: NORMAL
GLUCOSE QUALITATIVE U: NEGATIVE
HYALINE CASTS: 1 /LPF
KETONES URINE: NEGATIVE
LEUKOCYTE ESTERASE URINE: NEGATIVE
MICROSCOPIC-UA: NORMAL
NITRITE URINE: NEGATIVE
PH URINE: 7
PROTEIN URINE: NORMAL
RED BLOOD CELLS URINE: 2 /HPF
SPECIFIC GRAVITY URINE: 1.02
SQUAMOUS EPITHELIAL CELLS: 5 /HPF
UROBILINOGEN URINE: NORMAL
WHITE BLOOD CELLS URINE: 1 /HPF

## 2021-08-25 LAB
BACTERIA UR CULT: NORMAL
URINE CYTOLOGY: NORMAL

## 2021-08-29 ENCOUNTER — EMERGENCY (EMERGENCY)
Facility: HOSPITAL | Age: 46
LOS: 0 days | Discharge: HOME | End: 2021-08-29
Attending: STUDENT IN AN ORGANIZED HEALTH CARE EDUCATION/TRAINING PROGRAM
Payer: MEDICAID

## 2021-08-29 VITALS
RESPIRATION RATE: 20 BRPM | DIASTOLIC BLOOD PRESSURE: 67 MMHG | SYSTOLIC BLOOD PRESSURE: 116 MMHG | OXYGEN SATURATION: 100 % | HEART RATE: 75 BPM | TEMPERATURE: 98 F

## 2021-08-29 VITALS — WEIGHT: 164.91 LBS | HEIGHT: 72 IN

## 2021-08-29 DIAGNOSIS — M79.604 PAIN IN RIGHT LEG: ICD-10-CM

## 2021-08-29 DIAGNOSIS — Z87.448 PERSONAL HISTORY OF OTHER DISEASES OF URINARY SYSTEM: ICD-10-CM

## 2021-08-29 DIAGNOSIS — M54.5 LOW BACK PAIN: ICD-10-CM

## 2021-08-29 DIAGNOSIS — M54.9 DORSALGIA, UNSPECIFIED: ICD-10-CM

## 2021-08-29 DIAGNOSIS — M54.31 SCIATICA, RIGHT SIDE: ICD-10-CM

## 2021-08-29 LAB
APPEARANCE UR: CLEAR — SIGNIFICANT CHANGE UP
BILIRUB UR-MCNC: NEGATIVE — SIGNIFICANT CHANGE UP
COLOR SPEC: YELLOW — SIGNIFICANT CHANGE UP
DIFF PNL FLD: ABNORMAL
GLUCOSE UR QL: NEGATIVE MG/DL — SIGNIFICANT CHANGE UP
KETONES UR-MCNC: NEGATIVE — SIGNIFICANT CHANGE UP
LEUKOCYTE ESTERASE UR-ACNC: ABNORMAL
NITRITE UR-MCNC: NEGATIVE — SIGNIFICANT CHANGE UP
PH UR: 5 — SIGNIFICANT CHANGE UP (ref 5–8)
PROT UR-MCNC: 15 MG/DL
SP GR SPEC: 1.02 — SIGNIFICANT CHANGE UP (ref 1.01–1.02)
UROBILINOGEN FLD QL: NEGATIVE MG/DL — SIGNIFICANT CHANGE UP

## 2021-08-29 PROCEDURE — 81001 URINALYSIS AUTO W/SCOPE: CPT

## 2021-08-29 PROCEDURE — 96372 THER/PROPH/DIAG INJ SC/IM: CPT

## 2021-08-29 PROCEDURE — 99283 EMERGENCY DEPT VISIT LOW MDM: CPT | Mod: 25

## 2021-08-29 PROCEDURE — 81025 URINE PREGNANCY TEST: CPT

## 2021-08-29 PROCEDURE — 87086 URINE CULTURE/COLONY COUNT: CPT

## 2021-08-29 PROCEDURE — 99284 EMERGENCY DEPT VISIT MOD MDM: CPT

## 2021-08-29 RX ORDER — KETOROLAC TROMETHAMINE 30 MG/ML
30 SYRINGE (ML) INJECTION ONCE
Refills: 0 | Status: DISCONTINUED | OUTPATIENT
Start: 2021-08-29 | End: 2021-08-29

## 2021-08-29 RX ORDER — CYCLOBENZAPRINE HYDROCHLORIDE 10 MG/1
1 TABLET, FILM COATED ORAL
Qty: 21 | Refills: 0
Start: 2021-08-29 | End: 2021-09-04

## 2021-08-29 RX ORDER — CYCLOBENZAPRINE HYDROCHLORIDE 10 MG/1
10 TABLET, FILM COATED ORAL ONCE
Refills: 0 | Status: COMPLETED | OUTPATIENT
Start: 2021-08-29 | End: 2021-08-29

## 2021-08-29 RX ADMIN — Medication 30 MILLIGRAM(S): at 15:22

## 2021-08-29 RX ADMIN — Medication 30 MILLIGRAM(S): at 15:34

## 2021-08-29 RX ADMIN — CYCLOBENZAPRINE HYDROCHLORIDE 10 MILLIGRAM(S): 10 TABLET, FILM COATED ORAL at 15:22

## 2021-08-29 NOTE — ED STATDOCS - MUSCULOSKELETAL, MLM
range of motion is not limited and tenderness to the R paravertebral lumbar tenderness. Tenderness to the R anterior thigh

## 2021-08-29 NOTE — ED STATDOCS - PATIENT PORTAL LINK FT
You can access the FollowMyHealth Patient Portal offered by [facility] by registering at the following website: [email or web address]. By joining [employer]’s FollowMyHealth portal, you will also be able to view your health information using other applications (apps) compatible with our system.

## 2021-08-29 NOTE — ED STATDOCS - NSFOLLOWUPINSTRUCTIONS_ED_ALL_ED_FT
Ciática    Sciatica       La ciática es el dolor, entumecimiento, debilidad u hormigueo a lo ebony del nervio ciático. El nervio ciático comienza en la parte inferior de la espalda y desciende por la parte posterior de cada pierna. Controla los músculos en la parte inferior de las piernas y en la parte posterior de las rodillas. También proporciona sensibilidad a la parte posterior de los muslos, la parte inferior de las piernas y la planta de los pies. La ciática es un síntoma de otra afección que ejerce presión o “pellizca” el nervio ciático.    Con mayor frecuencia, la ciática afecta a un solo lado del cuerpo. Suele desaparecer por sí edvin o con tratamiento. En algunos casos, la ciática puede volver (ser recurrente).      ¿Cuáles son las causas?  Esta afección es causada por tosha presión sobre el nervio ciático o “pellizco” del nervio ciático. [place] puede ser el resultado de:  •Un disco que sobresale demasiado entre los huesos de la columna vertebral (hernia de disco).      •Cambios en los discos vertebrales relacionados con la edad.      •Un trastorno doloroso que afecta un músculo de las nalgas.      •Un crecimiento óseo adicional cerca del nervio ciático.      •Tosha rotura (fractura) de la pelvis.      •Embarazo.      •Tumor. [place] es poco frecuente.        ¿Qué incrementa el riesgo?  Los siguientes factores pueden hacer que sea más propenso a desarrollar esta afección:  •Practicar deportes que ponen presión o tensión sobre la columna vertebral.      •Tener poca fuerza y flexibilidad.      •Antecedentes de cirugía o lesión en la espalda.      •Estar sentado [place]annah largos períodos de tiempo.      •Realizar actividades que requieren agacharse o levantar objetos en forma repetida.      •Obesidad.        ¿Cuáles son los signos o los síntomas?  Los síntomas pueden ser leves o graves, y pueden incluir los siguientes:•Cualquiera de los siguientes problemas en la parte inferior de la espalda, piernas, cadera o nalgas:  •Hormigueo leve, adormecimiento o dolor sordo.      •Sensación de ardor.      •Dolor yeny.        •Adormecimiento de la parte posterior de la pantorrilla o la planta del pie.      •Debilidad en las piernas.      •Dolor de espalda intenso que dificulta el movimiento.      Los síntomas podrían empeorar al toser, estornudar o reírse, o cuando se está sentado o de pie [place]annah períodos prolongados.      ¿Cómo se diagnostica?  Esta afección se puede diagnosticar en función de lo siguiente:  •Los síntomas y los antecedentes médicos.      •Un examen físico.      •Análisis de rachna.    •Pruebas de diagnóstico por imágenes, por ejemplo:  •Radiografías.      •Resonancia magnética (RM).      •Exploración por tomografía computarizada (TC).          ¿Cómo se trata?  En muchos casos, esta afección mejora por sí edvin, sin tratamiento. Sin embargo, el tratamiento puede incluir:  •Reducción o modificación la actividad física.      •Ejercicios y estiramientos.      •Aplicación de calor o hielo en la freddy afectada.    •Medicamentos para lo siguiente:  •Aliviar el dolor y la inflamación.      •Relajar los músculos.        •Medicamentos inyectables que ayudan a aliviar el dolor, la irritación y la inflamación alrededor del nervio ciático (corticoesteroides).      •Cirugía.        Siga estas instrucciones en banegas casa:    Medicamentos     •[place] los medicamentos de venta chitra y los recetados solamente lindsay se lo haya indicado el médico.    •Pregúntele al médico si el medicamento recetado:  •Hace que sea necesario que evite conducir o usar maquinaria pesada.    •Puede causarle estreñimiento. Es posible que tenga que mckenzie estas medidas para prevenir o tratar el estreñimiento:  •Beber suficiente líquido lindsay para mantener la orina de color amarillo pálido.      •[place] medicamentos recetados o de venta chitra.      •Consumir alimentos ricos en fibra, lindsay frijoles, cereales integrales, y frutas y verduras frescas.      •Limitar el consumo de alimentos ricos en grasa y azúcares procesados, lindsay los alimentos fritos o dulces.            Control del dolor                 •Si se lo indican, aplique hielo sobre la freddy afectada.  •Ponga el hielo en tosha bolsa plástica.      •Coloque tosha toalla entre la piel y la bolsa.      •Coloque el hielo [place] 20 minutos, 2 a 3 veces por día.      •Si se lo indican, aplique calor en la freddy afectada. Use la kirsten de calor que el médico le recomiende, lindsay tosha compresa de calor húmedo o tosha almohadilla térmica.  •Coloque tosha toalla entre la piel y la kirsten de calor.      •Aplique calor [place]annah 20 a 30 minutos.      •Retire la kirsten de calor si la piel se pone de color alejandro brillante. [place] es especialmente importante si no puede sentir dolor, calor o frío. Puede correr un riesgo mayor de sufrir quemaduras.          Actividad      •Retome cuong actividades normales según lo indicado por el médico. Pregúntele al médico qué actividades son seguras para usted.      •Evite las actividades que empeoran los síntomas.    •[place] el día, descanse susannah lapsos breves.  •Cuando descanse susannah períodos más largos, incorpore alguna actividad suave o ejercicios de elongación entre los períodos de descanso. [place] ayudará a evitar la rigidez y el dolor.      •Evite estar sentado [place] largos períodos de tiempo sin moverse. Levántese y muévase al menos tosha vez cada hora.        •Khadra ejercicio y elongue habitualmente, lindsay se lo haya indicado el médico.      • No levante nada que pese más de 10 libras (4.5 kg) mientras tenga síntomas de ciática. Aunque no tenga síntomas, evite levantar objetos pesados, en especial en forma repetida.    •Siempre use las técnicas de levantamiento correctas para levantar objetos, entre ellas:  •Flexionar las rodillas.      •Mantener la carga cerca del cuerpo.      •No torcerse.        Instrucciones generales     •Mantenga un peso saludable. El exceso de peso ejerce tensión adicional sobre la espalda.      •Use calzado con buen apoyo y cómodo. Evite usar tacones.      •Evite dormir sobre un colchón que sea demasiado blando o demasiado rangel. Un colchón que ofrezca un apoyo suficientemente firme para banegas espalda al dormir puede ayudar a aliviar el dolor.      •Concurra a todas las visitas de seguimiento lindsay se lo haya indicado el médico. [place] es importante.        Comuníquese con un médico si:  •Tiene un dolor con estas características:  •Lo despierta cuando está dormido.      •Empeora al estar recostado.      •Es peor del que experimentó en el pasado.      •Dura más de 4 semanas.        •Pierde peso de manera inexplicable.        Solicite ayuda inmediatamente si:    •No puede controlar cuándo orinar o defecar (incontinencia).    •Tiene lo siguiente:  •Debilidad que empeora en la parte inferior de la espalda, la pelvis, las nalgas o las piernas.      •Enrojecimiento o inflamación en la espalda.      •Sensación de ardor al orinar.          Resumen    •La ciática es el dolor, entumecimiento, debilidad u hormigueo a lo ebony del nervio ciático.      •Esta afección es causada por tosha presión sobre el nervio ciático o “pellizco” del nervio ciático.      •La ciática puede causar dolor, adormecimiento u hormigueo en la parte inferior de la espalda, las piernas, las caderas y las nalgas.      •El tratamiento a menudo incluye reposo, ejercicios, medicamentos y aplicar hielo o calor.      Esta información no tiene lindsay fin reemplazar el consejo del médico. Asegúrese de hacerle al médico cualquier pregunta que tenga.      Rest. Drink plenty of fluids.  Take Flexeril as directed.  Tylenol 650 mg. PO every 4 hrs. for pain.  NO IBUPROFEN today until tomorrow after noon.   Then Ibuprofen 600 mg. PO every 6 hrs. for pain.    Follow up with PMD at [facility].

## 2021-08-29 NOTE — ED STATDOCS - CARE PROVIDER_API CALL
Álvaro Brunson)  Family Medicine  [address] [place] [address]  Phone: [phone]  Fax: [phone]  Follow Up Time:

## 2021-08-29 NOTE — ED STATDOCS - OBJECTIVE STATEMENT
45 y/o F with a PMHx of pyelonephritis presents to the ED c/o R leg pain x yesterday. States difficulty sleeping due to the pain.  Also c/o back pain. Has not taken any meds for the pain. Pt states she was recently Toviax. Denies CP or difficulty breathing. #: [id number]

## 2021-08-30 LAB
CULTURE RESULTS: SIGNIFICANT CHANGE UP
SPECIMEN SOURCE: SIGNIFICANT CHANGE UP

## 2021-09-07 ENCOUNTER — APPOINTMENT (OUTPATIENT)
Dept: UROLOGY | Facility: CLINIC | Age: 46
End: 2021-09-07

## 2021-09-09 ENCOUNTER — OUTPATIENT (OUTPATIENT)
Dept: OUTPATIENT SERVICES | Facility: HOSPITAL | Age: 46
LOS: 1 days | End: 2021-09-09
Payer: MEDICAID

## 2021-09-09 ENCOUNTER — APPOINTMENT (OUTPATIENT)
Dept: RADIOLOGY | Facility: CLINIC | Age: 46
End: 2021-09-09
Payer: MEDICAID

## 2021-09-09 DIAGNOSIS — M79.604 PAIN IN RIGHT LEG: ICD-10-CM

## 2021-09-09 PROCEDURE — 73502 X-RAY EXAM HIP UNI 2-3 VIEWS: CPT | Mod: 26,RT

## 2021-09-09 PROCEDURE — 72100 X-RAY EXAM L-S SPINE 2/3 VWS: CPT

## 2021-09-09 PROCEDURE — 72100 X-RAY EXAM L-S SPINE 2/3 VWS: CPT | Mod: 26

## 2021-09-09 PROCEDURE — 73502 X-RAY EXAM HIP UNI 2-3 VIEWS: CPT

## 2021-09-29 ENCOUNTER — APPOINTMENT (OUTPATIENT)
Dept: MRI IMAGING | Facility: CLINIC | Age: 46
End: 2021-09-29

## 2021-10-25 ENCOUNTER — APPOINTMENT (OUTPATIENT)
Dept: MRI IMAGING | Facility: CLINIC | Age: 46
End: 2021-10-25
Payer: MEDICAID

## 2021-10-25 ENCOUNTER — OUTPATIENT (OUTPATIENT)
Dept: OUTPATIENT SERVICES | Facility: HOSPITAL | Age: 46
LOS: 1 days | End: 2021-10-25
Payer: MEDICAID

## 2021-10-25 DIAGNOSIS — Z80.3 FAMILY HISTORY OF MALIGNANT NEOPLASM OF BREAST: ICD-10-CM

## 2021-10-25 DIAGNOSIS — R92.8 OTHER ABNORMAL AND INCONCLUSIVE FINDINGS ON DIAGNOSTIC IMAGING OF BREAST: ICD-10-CM

## 2021-10-25 PROCEDURE — 77049 MRI BREAST C-+ W/CAD BI: CPT | Mod: 26

## 2021-10-25 PROCEDURE — A9585: CPT

## 2021-10-25 PROCEDURE — C8937: CPT

## 2021-10-25 PROCEDURE — C8908: CPT

## 2021-11-29 ENCOUNTER — OUTPATIENT (OUTPATIENT)
Dept: OUTPATIENT SERVICES | Facility: HOSPITAL | Age: 46
LOS: 1 days | End: 2021-11-29
Payer: MEDICAID

## 2021-11-29 ENCOUNTER — APPOINTMENT (OUTPATIENT)
Dept: ULTRASOUND IMAGING | Facility: CLINIC | Age: 46
End: 2021-11-29
Payer: MEDICAID

## 2021-11-29 PROCEDURE — 76536 US EXAM OF HEAD AND NECK: CPT | Mod: 26

## 2021-11-29 PROCEDURE — 76536 US EXAM OF HEAD AND NECK: CPT

## 2021-12-14 ENCOUNTER — APPOINTMENT (OUTPATIENT)
Dept: DERMATOLOGY | Facility: CLINIC | Age: 46
End: 2021-12-14

## 2022-01-11 ENCOUNTER — NON-APPOINTMENT (OUTPATIENT)
Age: 47
End: 2022-01-11

## 2022-01-11 ENCOUNTER — APPOINTMENT (OUTPATIENT)
Dept: DERMATOLOGY | Facility: CLINIC | Age: 47
End: 2022-01-11
Payer: MEDICAID

## 2022-01-11 DIAGNOSIS — D17.23 BENIGN LIPOMATOUS NEOPLASM OF SKIN AND SUBCUTANEOUS TISSUE OF RIGHT LEG: ICD-10-CM

## 2022-01-11 DIAGNOSIS — L81.1 CHLOASMA: ICD-10-CM

## 2022-01-11 PROCEDURE — 99204 OFFICE O/P NEW MOD 45 MIN: CPT

## 2022-01-11 NOTE — ASSESSMENT
[FreeTextEntry1] : Lipomas;  \par nature explained; \par \par Therapeutic options and their risks and benefits; along with multiple diagnostic possibilities were discussed at length; risks and benefits of further study were discussed;\par \par excise only if becomes symptomatic; \par \par \par melasma;  HQ cream BID, fluticasone 1 week per month; \par \par Discussed via  (GP)

## 2022-01-11 NOTE — HISTORY OF PRESENT ILLNESS
[de-identified] : Lesions on legs; present @ 2 years; \par no pain, no treatments; \par also:  spots on face;

## 2022-01-11 NOTE — PHYSICAL EXAM
[FreeTextEntry3] : Mobile subcutaneous nodule with normal overlying skin - non tender;  R upper thigh; \par similar, smaller lesion L upper thigh; \par \par hyperpigmented patches;  b/l cheeks

## 2022-04-12 ENCOUNTER — OUTPATIENT (OUTPATIENT)
Dept: OUTPATIENT SERVICES | Facility: HOSPITAL | Age: 47
LOS: 1 days | End: 2022-04-12
Payer: MEDICAID

## 2022-04-12 ENCOUNTER — APPOINTMENT (OUTPATIENT)
Dept: ULTRASOUND IMAGING | Facility: CLINIC | Age: 47
End: 2022-04-12
Payer: MEDICAID

## 2022-04-12 ENCOUNTER — APPOINTMENT (OUTPATIENT)
Dept: OTOLARYNGOLOGY | Facility: CLINIC | Age: 47
End: 2022-04-12
Payer: MEDICAID

## 2022-04-12 VITALS
HEIGHT: 61 IN | BODY MASS INDEX: 34.93 KG/M2 | OXYGEN SATURATION: 100 % | DIASTOLIC BLOOD PRESSURE: 76 MMHG | HEART RATE: 68 BPM | WEIGHT: 185 LBS | SYSTOLIC BLOOD PRESSURE: 110 MMHG

## 2022-04-12 DIAGNOSIS — Z78.9 OTHER SPECIFIED HEALTH STATUS: ICD-10-CM

## 2022-04-12 DIAGNOSIS — R59.0 LOCALIZED ENLARGED LYMPH NODES: ICD-10-CM

## 2022-04-12 DIAGNOSIS — N92.0 EXCESSIVE AND FREQUENT MENSTRUATION WITH REGULAR CYCLE: ICD-10-CM

## 2022-04-12 PROCEDURE — 76830 TRANSVAGINAL US NON-OB: CPT | Mod: 26

## 2022-04-12 PROCEDURE — 76856 US EXAM PELVIC COMPLETE: CPT

## 2022-04-12 PROCEDURE — 76830 TRANSVAGINAL US NON-OB: CPT

## 2022-04-12 PROCEDURE — 99203 OFFICE O/P NEW LOW 30 MIN: CPT

## 2022-04-12 PROCEDURE — 76856 US EXAM PELVIC COMPLETE: CPT | Mod: 26

## 2022-04-12 NOTE — CONSULT LETTER
[Dear  ___] : Dear  [unfilled], [Consult Letter:] : I had the pleasure of evaluating your patient, [unfilled]. [Please see my note below.] : Please see my note below. [Consult Closing:] : Thank you very much for allowing me to participate in the care of this patient.  If you have any questions, please do not hesitate to contact me. [Sincerely,] : Sincerely, [FreeTextEntry2] : Álvaro Brunson MD ([place], [place]) [FreeTextEntry3] : Ryan Myrick MD, FACS\par \par    [facility] [place]\par Associate Chair\par    Department of Otolaryngology\par \par Professor\par Otolaryngology & Molecular Medicine\par [facility] [employer]\par

## 2022-04-12 NOTE — HISTORY OF PRESENT ILLNESS
[Difficulty Swallowing] : difficulty swallowing [None] : No associated symptoms are reported. [de-identified] : Ms. Romano is a 45 yo female who is being referred by Dr. Brunson (PCP) for evaluation on lymph node. She reports after having a physical exam and during a breast MRI was noted to have enlarged lymph node. She reports for about 1 month she is having trouble swallowing,  globus sensation, dry mouth and some voice changes. Denies being sick at time of imaging studies. Has not had FNA.\par 10/25/2021 MRI  enlarged abnormal appearing right cervical level 4 lymph node. \par 11/19/2021 sonogram showing small benign appearing partial cystic nodule in the right thyroid lobe. Otherwise, normal thyroid gland.  Right cervical enlarged lymph node level 4 measuring 2.4 cm.\par Denies unintentional weight loss or night sweats.\par Denies family history of head and neck cancer. \par COvid19 vacc. 2nd dose on  6/2021

## 2022-04-27 ENCOUNTER — RESULT REVIEW (OUTPATIENT)
Age: 47
End: 2022-04-27

## 2022-04-27 ENCOUNTER — OUTPATIENT (OUTPATIENT)
Dept: OUTPATIENT SERVICES | Facility: HOSPITAL | Age: 47
LOS: 1 days | End: 2022-04-27
Payer: MEDICAID

## 2022-04-27 ENCOUNTER — APPOINTMENT (OUTPATIENT)
Dept: ULTRASOUND IMAGING | Facility: CLINIC | Age: 47
End: 2022-04-27
Payer: MEDICAID

## 2022-04-27 DIAGNOSIS — R59.0 LOCALIZED ENLARGED LYMPH NODES: ICD-10-CM

## 2022-04-27 PROCEDURE — 20206 BIOPSY MUSCLE PERQ NEEDLE: CPT

## 2022-04-27 PROCEDURE — 76942 ECHO GUIDE FOR BIOPSY: CPT

## 2022-04-27 PROCEDURE — 76942 ECHO GUIDE FOR BIOPSY: CPT | Mod: 26

## 2022-05-04 LAB
T3 SERPL-MCNC: 136 NG/DL
T3FREE SERPL-MCNC: 3.82 PG/ML
T4 FREE SERPL-MCNC: 1.3 NG/DL
T4 SERPL-MCNC: 8.2 UG/DL
TSH SERPL-ACNC: 1.81 UIU/ML

## 2022-05-05 LAB
THYROGLOB AB SERPL-ACNC: <20 IU/ML
THYROGLOB SERPL-MCNC: 151 NG/ML

## 2022-05-06 ENCOUNTER — APPOINTMENT (OUTPATIENT)
Dept: ULTRASOUND IMAGING | Facility: CLINIC | Age: 47
End: 2022-05-06

## 2022-05-10 ENCOUNTER — NON-APPOINTMENT (OUTPATIENT)
Age: 47
End: 2022-05-10

## 2022-05-14 ENCOUNTER — OUTPATIENT (OUTPATIENT)
Dept: OUTPATIENT SERVICES | Facility: HOSPITAL | Age: 47
LOS: 1 days | End: 2022-05-14

## 2022-05-14 DIAGNOSIS — Z00.8 ENCOUNTER FOR OTHER GENERAL EXAMINATION: ICD-10-CM

## 2022-05-16 ENCOUNTER — RESULT REVIEW (OUTPATIENT)
Age: 47
End: 2022-05-16

## 2022-05-16 ENCOUNTER — OUTPATIENT (OUTPATIENT)
Dept: OUTPATIENT SERVICES | Facility: HOSPITAL | Age: 47
LOS: 1 days | End: 2022-05-16
Payer: MEDICAID

## 2022-05-16 ENCOUNTER — APPOINTMENT (OUTPATIENT)
Dept: ULTRASOUND IMAGING | Facility: CLINIC | Age: 47
End: 2022-05-16
Payer: MEDICAID

## 2022-05-16 DIAGNOSIS — C73 MALIGNANT NEOPLASM OF THYROID GLAND: ICD-10-CM

## 2022-05-16 PROCEDURE — 76536 US EXAM OF HEAD AND NECK: CPT | Mod: 26

## 2022-05-16 PROCEDURE — 76536 US EXAM OF HEAD AND NECK: CPT

## 2022-05-18 ENCOUNTER — APPOINTMENT (OUTPATIENT)
Dept: MRI IMAGING | Facility: CLINIC | Age: 47
End: 2022-05-18

## 2022-05-18 ENCOUNTER — OUTPATIENT (OUTPATIENT)
Dept: OUTPATIENT SERVICES | Facility: HOSPITAL | Age: 47
LOS: 1 days | End: 2022-05-18

## 2022-05-18 DIAGNOSIS — C73 MALIGNANT NEOPLASM OF THYROID GLAND: ICD-10-CM

## 2022-05-24 ENCOUNTER — APPOINTMENT (OUTPATIENT)
Dept: OTOLARYNGOLOGY | Facility: CLINIC | Age: 47
End: 2022-05-24

## 2022-05-31 ENCOUNTER — EMERGENCY (EMERGENCY)
Facility: HOSPITAL | Age: 47
LOS: 0 days | Discharge: HOME | End: 2022-06-01
Attending: HOSPITALIST
Payer: MEDICAID

## 2022-05-31 VITALS
DIASTOLIC BLOOD PRESSURE: 77 MMHG | TEMPERATURE: 98 F | OXYGEN SATURATION: 96 % | SYSTOLIC BLOOD PRESSURE: 102 MMHG | WEIGHT: 179.9 LBS | HEART RATE: 82 BPM | HEIGHT: 72 IN | RESPIRATION RATE: 18 BRPM

## 2022-05-31 DIAGNOSIS — D25.9 LEIOMYOMA OF UTERUS, UNSPECIFIED: ICD-10-CM

## 2022-05-31 DIAGNOSIS — N93.9 ABNORMAL UTERINE AND VAGINAL BLEEDING, UNSPECIFIED: ICD-10-CM

## 2022-05-31 PROCEDURE — 87086 URINE CULTURE/COLONY COUNT: CPT

## 2022-05-31 PROCEDURE — 96374 THER/PROPH/DIAG INJ IV PUSH: CPT

## 2022-05-31 PROCEDURE — 81001 URINALYSIS AUTO W/SCOPE: CPT

## 2022-05-31 PROCEDURE — 76856 US EXAM PELVIC COMPLETE: CPT

## 2022-05-31 PROCEDURE — 83690 ASSAY OF LIPASE: CPT

## 2022-05-31 PROCEDURE — 80053 COMPREHEN METABOLIC PANEL: CPT

## 2022-05-31 PROCEDURE — 85025 COMPLETE CBC W/AUTO DIFF WBC: CPT

## 2022-05-31 PROCEDURE — 99285 EMERGENCY DEPT VISIT HI MDM: CPT

## 2022-05-31 PROCEDURE — 36415 COLL VENOUS BLD VENIPUNCTURE: CPT

## 2022-05-31 PROCEDURE — 76830 TRANSVAGINAL US NON-OB: CPT

## 2022-05-31 PROCEDURE — 99284 EMERGENCY DEPT VISIT MOD MDM: CPT | Mod: 25

## 2022-05-31 PROCEDURE — 84702 CHORIONIC GONADOTROPIN TEST: CPT

## 2022-05-31 NOTE — ED ADULT TRIAGE NOTE - CHIEF COMPLAINT QUOTE
pt c/o abdominal pain since this AM. pt states she has been having abdominal pain and burning on urination . pt denies fevers, n/v/d. pt Kiswahili speaking  # [id number]. pt alert and oriented and ambulatory in triage.

## 2022-06-01 VITALS
DIASTOLIC BLOOD PRESSURE: 79 MMHG | TEMPERATURE: 98 F | SYSTOLIC BLOOD PRESSURE: 122 MMHG | HEART RATE: 73 BPM | RESPIRATION RATE: 16 BRPM | OXYGEN SATURATION: 97 %

## 2022-06-01 LAB
ALBUMIN SERPL ELPH-MCNC: 3.4 G/DL — SIGNIFICANT CHANGE UP (ref 3.3–5)
ALP SERPL-CCNC: 80 U/L — SIGNIFICANT CHANGE UP (ref 40–120)
ALT FLD-CCNC: 15 U/L — SIGNIFICANT CHANGE UP (ref 12–78)
ANION GAP SERPL CALC-SCNC: 6 MMOL/L — SIGNIFICANT CHANGE UP (ref 5–17)
APPEARANCE UR: ABNORMAL
AST SERPL-CCNC: 16 U/L — SIGNIFICANT CHANGE UP (ref 15–37)
BASOPHILS # BLD AUTO: 0.04 K/UL — SIGNIFICANT CHANGE UP (ref 0–0.2)
BASOPHILS NFR BLD AUTO: 0.4 % — SIGNIFICANT CHANGE UP (ref 0–2)
BILIRUB SERPL-MCNC: 0.3 MG/DL — SIGNIFICANT CHANGE UP (ref 0.2–1.2)
BILIRUB UR-MCNC: NEGATIVE — SIGNIFICANT CHANGE UP
BUN SERPL-MCNC: 10 MG/DL — SIGNIFICANT CHANGE UP (ref 7–23)
CALCIUM SERPL-MCNC: 8.5 MG/DL — SIGNIFICANT CHANGE UP (ref 8.5–10.1)
CHLORIDE SERPL-SCNC: 107 MMOL/L — SIGNIFICANT CHANGE UP (ref 96–108)
CO2 SERPL-SCNC: 27 MMOL/L — SIGNIFICANT CHANGE UP (ref 22–31)
COLOR SPEC: ABNORMAL
CREAT SERPL-MCNC: 0.55 MG/DL — SIGNIFICANT CHANGE UP (ref 0.5–1.3)
DIFF PNL FLD: ABNORMAL
EGFR: 114 ML/MIN/1.73M2 — SIGNIFICANT CHANGE UP
EOSINOPHIL # BLD AUTO: 0.32 K/UL — SIGNIFICANT CHANGE UP (ref 0–0.5)
EOSINOPHIL NFR BLD AUTO: 3.1 % — SIGNIFICANT CHANGE UP (ref 0–6)
GLUCOSE SERPL-MCNC: 111 MG/DL — HIGH (ref 70–99)
GLUCOSE UR QL: NEGATIVE — SIGNIFICANT CHANGE UP
HCG SERPL-ACNC: <1 MIU/ML — SIGNIFICANT CHANGE UP
HCT VFR BLD CALC: 35.8 % — SIGNIFICANT CHANGE UP (ref 34.5–45)
HGB BLD-MCNC: 11.5 G/DL — SIGNIFICANT CHANGE UP (ref 11.5–15.5)
IMM GRANULOCYTES NFR BLD AUTO: 0.4 % — SIGNIFICANT CHANGE UP (ref 0–1.5)
KETONES UR-MCNC: ABNORMAL
LEUKOCYTE ESTERASE UR-ACNC: ABNORMAL
LIDOCAIN IGE QN: 88 U/L — SIGNIFICANT CHANGE UP (ref 73–393)
LYMPHOCYTES # BLD AUTO: 2.58 K/UL — SIGNIFICANT CHANGE UP (ref 1–3.3)
LYMPHOCYTES # BLD AUTO: 24.9 % — SIGNIFICANT CHANGE UP (ref 13–44)
MCHC RBC-ENTMCNC: 26.6 PG — LOW (ref 27–34)
MCHC RBC-ENTMCNC: 32.1 GM/DL — SIGNIFICANT CHANGE UP (ref 32–36)
MCV RBC AUTO: 82.9 FL — SIGNIFICANT CHANGE UP (ref 80–100)
MONOCYTES # BLD AUTO: 0.72 K/UL — SIGNIFICANT CHANGE UP (ref 0–0.9)
MONOCYTES NFR BLD AUTO: 7 % — SIGNIFICANT CHANGE UP (ref 2–14)
NEUTROPHILS # BLD AUTO: 6.65 K/UL — SIGNIFICANT CHANGE UP (ref 1.8–7.4)
NEUTROPHILS NFR BLD AUTO: 64.2 % — SIGNIFICANT CHANGE UP (ref 43–77)
NITRITE UR-MCNC: NEGATIVE — SIGNIFICANT CHANGE UP
PH UR: 5 — SIGNIFICANT CHANGE UP (ref 5–8)
PLATELET # BLD AUTO: 225 K/UL — SIGNIFICANT CHANGE UP (ref 150–400)
POTASSIUM SERPL-MCNC: 3.3 MMOL/L — LOW (ref 3.5–5.3)
POTASSIUM SERPL-SCNC: 3.3 MMOL/L — LOW (ref 3.5–5.3)
PROT SERPL-MCNC: 7.2 GM/DL — SIGNIFICANT CHANGE UP (ref 6–8.3)
PROT UR-MCNC: 300 MG/DL — SIGNIFICANT CHANGE UP
RBC # BLD: 4.32 M/UL — SIGNIFICANT CHANGE UP (ref 3.8–5.2)
RBC # FLD: 17.5 % — HIGH (ref 10.3–14.5)
SODIUM SERPL-SCNC: 140 MMOL/L — SIGNIFICANT CHANGE UP (ref 135–145)
SP GR SPEC: 1.02 — SIGNIFICANT CHANGE UP (ref 1.01–1.02)
UROBILINOGEN FLD QL: 1
WBC # BLD: 10.35 K/UL — SIGNIFICANT CHANGE UP (ref 3.8–10.5)
WBC # FLD AUTO: 10.35 K/UL — SIGNIFICANT CHANGE UP (ref 3.8–10.5)

## 2022-06-01 PROCEDURE — 76830 TRANSVAGINAL US NON-OB: CPT | Mod: 26

## 2022-06-01 PROCEDURE — 76856 US EXAM PELVIC COMPLETE: CPT | Mod: 26,59

## 2022-06-01 RX ORDER — KETOROLAC TROMETHAMINE 30 MG/ML
30 SYRINGE (ML) INJECTION ONCE
Refills: 0 | Status: DISCONTINUED | OUTPATIENT
Start: 2022-06-01 | End: 2022-06-01

## 2022-06-01 RX ADMIN — Medication 30 MILLIGRAM(S): at 02:03

## 2022-06-01 RX ADMIN — Medication 30 MILLIGRAM(S): at 01:48

## 2022-06-01 NOTE — ED ADULT NURSE NOTE - NSSEPSISSUSPECTED_ED_A_ED
Patient was not available for the therapy session at this time.  Reason not seen: Being off the floor at medical test/procedure;Other (comment)(MRI) (05/01/19 1150).     Re-Attempt Plan: Will re-attempt per established treatment plan (05/01/19 1150).   No

## 2022-06-01 NOTE — ED ADULT NURSE NOTE - OBJECTIVE STATEMENT
Pt A&O x 4, pain in abdomen, blood in urine, states last period was 3 months ago, burning and frequent urination, blood labs and urine specimen obtained and sent to lab on generic labels, IV inserted into left AC 20 G, Pt tolerated well.

## 2022-06-01 NOTE — ED PROVIDER NOTE - OBJECTIVE STATEMENT
46F p/w vaginal bleeding and cramping for the past 2 days. patient states she hasn't had her period for the past 3 months but it restartd monday night. + pain tuesday morning. no dysuria or vaginal discharge.

## 2022-06-01 NOTE — ED ADULT NURSE NOTE - CHIEF COMPLAINT QUOTE
pt c/o abdominal pain since this AM. pt states she has been having abdominal pain and burning on urination . pt denies fevers, n/v/d. pt Frisian speaking  # [phone]. pt alert and oriented and ambulatory in triage.

## 2022-06-01 NOTE — ED PROVIDER NOTE - NSFOLLOWUPINSTRUCTIONS_ED_ALL_ED_FT
Take tylenol as needed for pain, 650Mg every 6-8 hours.  You can also take ibuprofen as needed for pain, 600mg every 6-8 hours, take with food.  please follow up with your gynecologist tomorrow as planned.

## 2022-06-01 NOTE — ED ADULT NURSE NOTE - NS ED NURSE DC INFO COMPLEXITY
arm (palm outward) behind your back by the wrist. Pull your arm up gently to stretch your shoulder. 3. Next, put a towel over your other shoulder. Put the hand of your injured arm behind your back. Now hold the back end of the towel. With the other hand, hold the front end of the towel in front of your body. Pull gently on the front end of the towel. This will bring your hand farther up your back to stretch your shoulder. Overhead stretch    1. Standing about an arm's length away, grasp onto a solid surface. You could use a countertop, a doorknob, or the back of a sturdy chair. 2. With your knees slightly bent, bend forward with your arms straight. Lower your upper body, and let your shoulders stretch. 3. As your shoulders are able to stretch farther, you may need to take a step or two backward. 4. Hold for at least 15 to 30 seconds. Then stand up and relax. If you had stepped back during your stretch, step forward so you can keep your hands on the solid surface. 5. Repeat 2 to 4 times. Shoulder flexion (lying down)    To make a wand for this exercise, use a piece of PVC pipe or a broom handle with the broom removed. Make the wand about a foot wider than your shoulders. 1. Lie on your back, holding a wand with both hands. Your palms should face down as you hold the wand. 2. Keeping your elbows straight, slowly raise your arms over your head. Raise them until you feel a stretch in your shoulders, upper back, and chest.  3. Hold for 15 to 30 seconds. 4. Repeat 2 to 4 times. Shoulder rotation (lying down)    To make a wand for this exercise, use a piece of PVC pipe or a broom handle with the broom removed. Make the wand about a foot wider than your shoulders. 1. Lie on your back. Hold a wand with both hands with your elbows bent and palms up. 2. Keep your elbows close to your body, and move the wand across your body toward the sore arm. 3. Hold for 8 to 12 seconds. 4. Repeat 2 to 4 times.   Wall climbing Simple: Patient demonstrates quick and easy understanding

## 2022-06-02 LAB
CULTURE RESULTS: SIGNIFICANT CHANGE UP
SPECIMEN SOURCE: SIGNIFICANT CHANGE UP

## 2022-06-06 ENCOUNTER — OUTPATIENT (OUTPATIENT)
Dept: OUTPATIENT SERVICES | Facility: HOSPITAL | Age: 47
LOS: 1 days | End: 2022-06-06
Payer: MEDICAID

## 2022-06-06 ENCOUNTER — APPOINTMENT (OUTPATIENT)
Dept: MRI IMAGING | Facility: CLINIC | Age: 47
End: 2022-06-06
Payer: MEDICAID

## 2022-06-06 DIAGNOSIS — C73 MALIGNANT NEOPLASM OF THYROID GLAND: ICD-10-CM

## 2022-06-06 PROCEDURE — 70543 MRI ORBT/FAC/NCK W/O &W/DYE: CPT

## 2022-06-06 PROCEDURE — 70543 MRI ORBT/FAC/NCK W/O &W/DYE: CPT | Mod: 26

## 2022-06-21 ENCOUNTER — APPOINTMENT (OUTPATIENT)
Dept: OBGYN | Facility: CLINIC | Age: 47
End: 2022-06-21
Payer: MEDICAID

## 2022-06-21 VITALS
HEIGHT: 61 IN | BODY MASS INDEX: 34.93 KG/M2 | SYSTOLIC BLOOD PRESSURE: 110 MMHG | WEIGHT: 185 LBS | DIASTOLIC BLOOD PRESSURE: 60 MMHG

## 2022-06-21 DIAGNOSIS — N92.0 EXCESSIVE AND FREQUENT MENSTRUATION WITH REGULAR CYCLE: ICD-10-CM

## 2022-06-21 DIAGNOSIS — D21.9 BENIGN NEOPLASM OF CONNECTIVE AND OTHER SOFT TISSUE, UNSPECIFIED: ICD-10-CM

## 2022-06-21 PROCEDURE — 99204 OFFICE O/P NEW MOD 45 MIN: CPT | Mod: 57

## 2022-06-21 NOTE — COUNSELING
Harish Martinez PA-C  1/23/2020     [FreeTextEntry2] : FIBROID TREATMENT OPTIONS

## 2022-06-21 NOTE — HISTORY OF PRESENT ILLNESS
[FreeTextEntry1] : 47 YO  WITH MENORRHAGIA AND FIBROID UTERUS REFERRED FROM THE [facility] FOR SURGICAL CONSULTATION. PT IS CURRENTLY ON OCPS FOR CONTROL OF MENSES WITHOUT RELIEF. PT HAS BEEN TAKING IRON SUPPLEMENTATION TOO. PT NOTES MENSES LASTING 10-12 DAYS A MONTH. (PACIFIC )

## 2022-06-21 NOTE — PLAN
[FreeTextEntry1] : PT PRESENTS FOR CONSULT REGARDING UTERINE FIBROIDS. FINDINGS ON IMAGING STUDIES REVIEWED. ILLUSTRATION OF THE MYOMATA AND LOCATION GIVEN. TREATMENT OPTIONS DISCUSSED INCLUDING NON SURGERY AND SURGICAL OPTIONS. MYOMECTOMY VS HYSTERECTOMY DISCUSSED. ALL QUESTIONS ASKED AND ANSWERED. WE DISCUSSED HYSTERECTOMY OPTIONS INCLUDING OPEN VERSUS LAPAROSCOPIC/ROBOTICALLY ASSISTED HYSTERECTOMY. WE REVIEWED THE RISKS OF SURGERY INCLUDING BLEEDING, INFECTION AND DAMAGE TO BOWEL, URINARY TRACT AND BLOOD VESSELS. I DISCUSSED WITH THE PATIENT THE PROS AND CONS OF REMOVING THE CERVIX AND OVARIES PT HAS A HISTORY OF ABNORMAL PAP SMEAR WITH NEGATIVE BX. RECOMMENDED REMOVING THE CERVIX. DISCUSSED WITH HER.

## 2022-06-21 NOTE — PHYSICAL EXAM
[Appropriately responsive] : appropriately responsive [Oriented x3] : oriented x3 [FreeTextEntry7] : UTERUS PALPABLE TO 15 CM

## 2022-06-21 NOTE — REVIEW OF SYSTEMS
[Patient Intake Form Reviewed] : Patient intake form was reviewed [Abdominal Pain] : abdominal pain [Abn Vaginal bleeding] : abnormal vaginal bleeding [Pelvic pain] : pelvic pain
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
## 2022-06-28 ENCOUNTER — APPOINTMENT (OUTPATIENT)
Dept: OTOLARYNGOLOGY | Facility: CLINIC | Age: 47
End: 2022-06-28

## 2022-06-28 VITALS
WEIGHT: 186 LBS | SYSTOLIC BLOOD PRESSURE: 110 MMHG | OXYGEN SATURATION: 100 % | BODY MASS INDEX: 35.12 KG/M2 | HEIGHT: 61 IN | DIASTOLIC BLOOD PRESSURE: 73 MMHG | HEART RATE: 76 BPM

## 2022-06-28 PROCEDURE — 99214 OFFICE O/P EST MOD 30 MIN: CPT

## 2022-06-28 NOTE — CONSULT LETTER
[Dear  ___] : Dear  [unfilled], [Courtesy Letter:] : I had the pleasure of seeing your patient, [unfilled], in my office today. [Please see my note below.] : Please see my note below. [Sincerely,] : Sincerely, [FreeTextEntry2] : Álvaro Brunson MD ([place], [place]) [FreeTextEntry3] : Ryan Myrick MD, FACS\par \par    [facility] [place]\par Associate Chair\par    Department of Otolaryngology\par \par Professor\par Otolaryngology & Molecular Medicine\par [facility] [employer]\par

## 2022-06-28 NOTE — HISTORY OF PRESENT ILLNESS
[None] : No associated symptoms are reported. [de-identified] : Ms. Romano presetns for surgical discussion. Scheduled surgery for 7/18/2022.\par 4/27/2022 FNA right neck level 4 positive for PTCA\par 5/4/2022 TFT wnr Thyroglobulin 151 \par  6/29/2022  FNA scheduled for  Left level 2  2.5 cm node which was noted on recent MRI on 6/6/2022 \par Denies pain, dysphagia, dysphonia and or dyspnea. Dryness in her throat.

## 2022-06-29 ENCOUNTER — RESULT REVIEW (OUTPATIENT)
Age: 47
End: 2022-06-29

## 2022-06-29 ENCOUNTER — OUTPATIENT (OUTPATIENT)
Dept: OUTPATIENT SERVICES | Facility: HOSPITAL | Age: 47
LOS: 1 days | End: 2022-06-29
Payer: MEDICAID

## 2022-06-29 ENCOUNTER — APPOINTMENT (OUTPATIENT)
Dept: ULTRASOUND IMAGING | Facility: CLINIC | Age: 47
End: 2022-06-29

## 2022-06-29 DIAGNOSIS — C73 MALIGNANT NEOPLASM OF THYROID GLAND: ICD-10-CM

## 2022-06-29 PROCEDURE — 20206 BIOPSY MUSCLE PERQ NEEDLE: CPT

## 2022-06-29 PROCEDURE — 76942 ECHO GUIDE FOR BIOPSY: CPT | Mod: 26

## 2022-06-29 PROCEDURE — 76942 ECHO GUIDE FOR BIOPSY: CPT

## 2022-07-06 ENCOUNTER — OUTPATIENT (OUTPATIENT)
Dept: OUTPATIENT SERVICES | Facility: HOSPITAL | Age: 47
LOS: 1 days | End: 2022-07-06
Payer: MEDICAID

## 2022-07-06 VITALS
WEIGHT: 182.98 LBS | HEIGHT: 61.5 IN | SYSTOLIC BLOOD PRESSURE: 105 MMHG | HEART RATE: 65 BPM | TEMPERATURE: 98 F | DIASTOLIC BLOOD PRESSURE: 71 MMHG | OXYGEN SATURATION: 100 % | RESPIRATION RATE: 18 BRPM

## 2022-07-06 DIAGNOSIS — Z01.818 ENCOUNTER FOR OTHER PREPROCEDURAL EXAMINATION: ICD-10-CM

## 2022-07-06 DIAGNOSIS — Z98.890 OTHER SPECIFIED POSTPROCEDURAL STATES: Chronic | ICD-10-CM

## 2022-07-06 DIAGNOSIS — Z98.891 HISTORY OF UTERINE SCAR FROM PREVIOUS SURGERY: Chronic | ICD-10-CM

## 2022-07-06 DIAGNOSIS — C77.0 SECONDARY AND UNSPECIFIED MALIGNANT NEOPLASM OF LYMPH NODES OF HEAD, FACE AND NECK: ICD-10-CM

## 2022-07-06 DIAGNOSIS — C73 MALIGNANT NEOPLASM OF THYROID GLAND: ICD-10-CM

## 2022-07-06 DIAGNOSIS — Z90.49 ACQUIRED ABSENCE OF OTHER SPECIFIED PARTS OF DIGESTIVE TRACT: Chronic | ICD-10-CM

## 2022-07-06 DIAGNOSIS — D64.9 ANEMIA, UNSPECIFIED: ICD-10-CM

## 2022-07-06 LAB
HCT VFR BLD CALC: 38.5 % — SIGNIFICANT CHANGE UP (ref 34.5–45)
HGB BLD-MCNC: 12.5 G/DL — SIGNIFICANT CHANGE UP (ref 11.5–15.5)
MCHC RBC-ENTMCNC: 27.5 PG — SIGNIFICANT CHANGE UP (ref 27–34)
MCHC RBC-ENTMCNC: 32.5 GM/DL — SIGNIFICANT CHANGE UP (ref 32–36)
MCV RBC AUTO: 84.8 FL — SIGNIFICANT CHANGE UP (ref 80–100)
NRBC # BLD: 0 /100 WBCS — SIGNIFICANT CHANGE UP (ref 0–0)
PLATELET # BLD AUTO: 269 K/UL — SIGNIFICANT CHANGE UP (ref 150–400)
RBC # BLD: 4.54 M/UL — SIGNIFICANT CHANGE UP (ref 3.8–5.2)
RBC # FLD: 14.6 % — HIGH (ref 10.3–14.5)
WBC # BLD: 8.33 K/UL — SIGNIFICANT CHANGE UP (ref 3.8–10.5)
WBC # FLD AUTO: 8.33 K/UL — SIGNIFICANT CHANGE UP (ref 3.8–10.5)

## 2022-07-06 PROCEDURE — G0463: CPT

## 2022-07-06 PROCEDURE — 85027 COMPLETE CBC AUTOMATED: CPT

## 2022-07-06 PROCEDURE — 93005 ELECTROCARDIOGRAM TRACING: CPT

## 2022-07-06 PROCEDURE — 93010 ELECTROCARDIOGRAM REPORT: CPT | Mod: NC

## 2022-07-06 PROCEDURE — 36415 COLL VENOUS BLD VENIPUNCTURE: CPT

## 2022-07-06 NOTE — H&P PST ADULT - COMMENTS
s/p cervical procedure with removal of abnormal cells per patient - unsure of procedure name or results prior to procedure

## 2022-07-06 NOTE — H&P PST ADULT - NSANTHOSAYNRD_GEN_A_CORE
neck 16 inches/No. LEO screening performed.  STOP BANG Legend: 0-2 = LOW Risk; 3-4 = INTERMEDIATE Risk; 5-8 = HIGH Risk

## 2022-07-06 NOTE — H&P PST ADULT - HISTORY OF PRESENT ILLNESS
46 y/o Hebrew speaking female with PMH of anemia presents for PST.  C/O thyroid nodules that were biopsied and pos for malignancy.  Feeling well today at PST with no recent cough, fever or illness  Scheduled for left thyroidectomy with limited neck dissection, right modified radical neck dissection with PTH assay with Nerve monitoring with Dr Myrick on 07/18/2022.  COVID-19 testing scheduled via surgeons office per patient.  7/15/2022

## 2022-07-06 NOTE — H&P PST ADULT - NSICDXPASTMEDICALHX_GEN_ALL_CORE_FT
PAST MEDICAL HISTORY:  Anemia     History of 2019 novel coronavirus disease (COVID-19) 3/2020 not resp complications, not hospitalized    Pyelonephritis

## 2022-07-06 NOTE — H&P PST ADULT - FALL HARM RISK - UNIVERSAL INTERVENTIONS
Bed in lowest position, wheels locked, appropriate side rails in place/Call bell, personal items and telephone in reach/Instruct patient to call for assistance before getting out of bed or chair/Non-slip footwear when patient is out of bed/[place] to call system/Physically safe environment - no spills, clutter or unnecessary equipment/Purposeful Proactive Rounding/Room/bathroom lighting operational, light cord in reach

## 2022-07-06 NOTE — H&P PST ADULT - PROBLEM SELECTOR PLAN 1
Scheduled for left thyroidectomy with limited neck dissection, right modified radical neck dissection with PTH assay with Nerve monitoring with Dr Myrick on 07/18/2022.  COVID-19 testing scheduled via surgeons office per patient.  7/15/2022.  Pre op instructions given and patient verbalized understanding.  CBC, BMP, EKG and medical clearance pending.  NPO after midnight night before procedure.  To stop all ASA, NSAIDs, vitamins and supplements 1 week prior to procedure.  Chlorhexidine wash given with instructions.  UCG Am of surgery

## 2022-07-14 ENCOUNTER — NON-APPOINTMENT (OUTPATIENT)
Age: 47
End: 2022-07-14

## 2022-07-17 ENCOUNTER — TRANSCRIPTION ENCOUNTER (OUTPATIENT)
Age: 47
End: 2022-07-17

## 2022-07-18 ENCOUNTER — APPOINTMENT (OUTPATIENT)
Dept: OTOLARYNGOLOGY | Facility: HOSPITAL | Age: 47
End: 2022-07-18

## 2022-07-18 ENCOUNTER — RESULT REVIEW (OUTPATIENT)
Age: 47
End: 2022-07-18

## 2022-07-18 ENCOUNTER — INPATIENT (INPATIENT)
Facility: HOSPITAL | Age: 47
LOS: 0 days | Discharge: HOME | DRG: 626 | End: 2022-07-19
Attending: OTOLARYNGOLOGY | Admitting: OTOLARYNGOLOGY
Payer: MEDICAID

## 2022-07-18 VITALS
WEIGHT: 186.07 LBS | RESPIRATION RATE: 16 BRPM | DIASTOLIC BLOOD PRESSURE: 74 MMHG | HEIGHT: 65 IN | TEMPERATURE: 98 F | HEART RATE: 83 BPM | SYSTOLIC BLOOD PRESSURE: 104 MMHG | OXYGEN SATURATION: 97 %

## 2022-07-18 DIAGNOSIS — Z90.49 ACQUIRED ABSENCE OF OTHER SPECIFIED PARTS OF DIGESTIVE TRACT: Chronic | ICD-10-CM

## 2022-07-18 DIAGNOSIS — C73 MALIGNANT NEOPLASM OF THYROID GLAND: ICD-10-CM

## 2022-07-18 DIAGNOSIS — Z98.890 OTHER SPECIFIED POSTPROCEDURAL STATES: Chronic | ICD-10-CM

## 2022-07-18 DIAGNOSIS — Z98.891 HISTORY OF UTERINE SCAR FROM PREVIOUS SURGERY: Chronic | ICD-10-CM

## 2022-07-18 LAB
BLD GP AB SCN SERPL QL: SIGNIFICANT CHANGE UP
CALCIUM SERPL-MCNC: 8.1 MG/DL — LOW (ref 8.4–10.5)
PTH INTACT, INTRAOP TIMING 2: SIGNIFICANT CHANGE UP
PTH INTACT, INTRAOPERATIVE 2: 17 PG/ML — SIGNIFICANT CHANGE UP (ref 15–65)
PTH-INTACT IO % DIF SERPL: 119 PG/ML — HIGH (ref 15–65)

## 2022-07-18 PROCEDURE — 88307 TISSUE EXAM BY PATHOLOGIST: CPT | Mod: 26

## 2022-07-18 PROCEDURE — 60252 REMOVAL OF THYROID: CPT

## 2022-07-18 PROCEDURE — 60240 REMOVAL OF THYROID: CPT | Mod: AS

## 2022-07-18 PROCEDURE — 88305 TISSUE EXAM BY PATHOLOGIST: CPT | Mod: 26,59

## 2022-07-18 DEVICE — SPONGE HSTAT SURGICEL 2X14": Type: IMPLANTABLE DEVICE | Site: LEFT | Status: FUNCTIONAL

## 2022-07-18 DEVICE — CLIP 24 SMALL TITAN: Type: IMPLANTABLE DEVICE | Site: LEFT | Status: FUNCTIONAL

## 2022-07-18 DEVICE — ET TUBE SZ 7 NIM TRIVANTAGE EMG: Type: IMPLANTABLE DEVICE | Site: LEFT | Status: FUNCTIONAL

## 2022-07-18 DEVICE — HEMOCLIP MED BLUE 6 CARTRIDGE TITANIUM: Type: IMPLANTABLE DEVICE | Site: LEFT | Status: FUNCTIONAL

## 2022-07-18 DEVICE — TUBE EMG NIM ENDO TRIVANTAGE 8MM: Type: IMPLANTABLE DEVICE | Site: LEFT | Status: FUNCTIONAL

## 2022-07-18 DEVICE — CARTRIDGE MICROCLIP 30: Type: IMPLANTABLE DEVICE | Site: LEFT | Status: FUNCTIONAL

## 2022-07-18 RX ORDER — OXYCODONE HYDROCHLORIDE 5 MG/1
5 TABLET ORAL EVERY 6 HOURS
Refills: 0 | Status: DISCONTINUED | OUTPATIENT
Start: 2022-07-18 | End: 2022-07-19

## 2022-07-18 RX ORDER — SODIUM CHLORIDE 9 MG/ML
1000 INJECTION, SOLUTION INTRAVENOUS
Refills: 0 | Status: DISCONTINUED | OUTPATIENT
Start: 2022-07-18 | End: 2022-07-18

## 2022-07-18 RX ORDER — ONDANSETRON 8 MG/1
4 TABLET, FILM COATED ORAL EVERY 6 HOURS
Refills: 0 | Status: DISCONTINUED | OUTPATIENT
Start: 2022-07-18 | End: 2022-07-19

## 2022-07-18 RX ORDER — HYDROMORPHONE HYDROCHLORIDE 2 MG/ML
0.5 INJECTION INTRAMUSCULAR; INTRAVENOUS; SUBCUTANEOUS
Refills: 0 | Status: DISCONTINUED | OUTPATIENT
Start: 2022-07-18 | End: 2022-07-18

## 2022-07-18 RX ORDER — MORPHINE SULFATE 50 MG/1
2 CAPSULE, EXTENDED RELEASE ORAL EVERY 6 HOURS
Refills: 0 | Status: DISCONTINUED | OUTPATIENT
Start: 2022-07-18 | End: 2022-07-19

## 2022-07-18 RX ORDER — LEVOTHYROXINE SODIUM 125 MCG
125 TABLET ORAL DAILY
Refills: 0 | Status: DISCONTINUED | OUTPATIENT
Start: 2022-07-18 | End: 2022-07-19

## 2022-07-18 RX ORDER — SODIUM CHLORIDE 9 MG/ML
1000 INJECTION, SOLUTION INTRAVENOUS
Refills: 0 | Status: DISCONTINUED | OUTPATIENT
Start: 2022-07-18 | End: 2022-07-19

## 2022-07-18 RX ORDER — CALCIUM CARBONATE 500(1250)
4 TABLET ORAL EVERY 8 HOURS
Refills: 0 | Status: DISCONTINUED | OUTPATIENT
Start: 2022-07-18 | End: 2022-07-19

## 2022-07-18 RX ORDER — CEFAZOLIN SODIUM 1 G
2000 VIAL (EA) INJECTION EVERY 8 HOURS
Refills: 0 | Status: COMPLETED | OUTPATIENT
Start: 2022-07-18 | End: 2022-07-19

## 2022-07-18 RX ORDER — ACETAMINOPHEN 500 MG
650 TABLET ORAL EVERY 6 HOURS
Refills: 0 | Status: DISCONTINUED | OUTPATIENT
Start: 2022-07-18 | End: 2022-07-19

## 2022-07-18 RX ORDER — CALCITRIOL 0.5 UG/1
0.5 CAPSULE ORAL DAILY
Refills: 0 | Status: DISCONTINUED | OUTPATIENT
Start: 2022-07-18 | End: 2022-07-19

## 2022-07-18 RX ORDER — ONDANSETRON 8 MG/1
4 TABLET, FILM COATED ORAL ONCE
Refills: 0 | Status: DISCONTINUED | OUTPATIENT
Start: 2022-07-18 | End: 2022-07-18

## 2022-07-18 RX ADMIN — OXYCODONE HYDROCHLORIDE 5 MILLIGRAM(S): 5 TABLET ORAL at 21:54

## 2022-07-18 RX ADMIN — Medication 100 MILLIGRAM(S): at 21:54

## 2022-07-18 RX ADMIN — SODIUM CHLORIDE 50 MILLILITER(S): 9 INJECTION, SOLUTION INTRAVENOUS at 10:25

## 2022-07-18 RX ADMIN — CALCITRIOL 0.5 MICROGRAM(S): 0.5 CAPSULE ORAL at 18:24

## 2022-07-18 RX ADMIN — Medication 4 TABLET(S): at 18:26

## 2022-07-18 NOTE — PRE-OP CHECKLIST - 2.
left upper eyelid redness and some swelling, evaluated by Dr. Narayanan, Dr. Lo NOTIFIED ok to proceed with surgery

## 2022-07-18 NOTE — BRIEF OPERATIVE NOTE - NSICDXBRIEFPROCEDURE_GEN_ALL_CORE_FT
PROCEDURES:  Thyroidectomy, total 18-Jul-2022 18:11:02 with right paratracheal lymph node dissection Marva Gale

## 2022-07-18 NOTE — PATIENT PROFILE ADULT - FALL HARM RISK - UNIVERSAL INTERVENTIONS
Bed in lowest position, wheels locked, appropriate side rails in place/Call bell, personal items and telephone in reach/Instruct patient to call for assistance before getting out of bed or chair/Non-slip footwear when patient is out of bed/[place] to call system/Physically safe environment - no spills, clutter or unnecessary equipment/Purposeful Proactive Rounding/Room/bathroom lighting operational, light cord in reach

## 2022-07-19 ENCOUNTER — TRANSCRIPTION ENCOUNTER (OUTPATIENT)
Age: 47
End: 2022-07-19

## 2022-07-19 VITALS
HEART RATE: 66 BPM | OXYGEN SATURATION: 99 % | DIASTOLIC BLOOD PRESSURE: 73 MMHG | RESPIRATION RATE: 18 BRPM | TEMPERATURE: 98 F | SYSTOLIC BLOOD PRESSURE: 129 MMHG

## 2022-07-19 LAB
CALCIUM SERPL-MCNC: 7.8 MG/DL — LOW (ref 8.4–10.5)
CALCIUM SERPL-MCNC: 7.8 MG/DL — LOW (ref 8.4–10.5)
CALCIUM SERPL-MCNC: 8.3 MG/DL — LOW (ref 8.4–10.5)
CALCIUM SERPL-MCNC: 8.7 MG/DL — SIGNIFICANT CHANGE UP (ref 8.4–10.5)
PTH-INTACT FLD-MCNC: 9 PG/ML — LOW (ref 15–65)

## 2022-07-19 RX ORDER — CALCITRIOL 0.5 UG/1
1 CAPSULE ORAL
Qty: 60 | Refills: 0
Start: 2022-07-19 | End: 2022-08-17

## 2022-07-19 RX ORDER — OXYCODONE HYDROCHLORIDE 5 MG/1
1 TABLET ORAL
Qty: 12 | Refills: 0
Start: 2022-07-19 | End: 2022-07-21

## 2022-07-19 RX ORDER — LEVOTHYROXINE SODIUM 125 MCG
1 TABLET ORAL
Qty: 30 | Refills: 0
Start: 2022-07-19 | End: 2022-08-17

## 2022-07-19 RX ORDER — CALCITRIOL 0.5 UG/1
0.5 CAPSULE ORAL ONCE
Refills: 0 | Status: COMPLETED | OUTPATIENT
Start: 2022-07-19 | End: 2022-07-19

## 2022-07-19 RX ORDER — CALCIUM CARBONATE 500(1250)
2 TABLET ORAL THREE TIMES A DAY
Refills: 0 | Status: DISCONTINUED | OUTPATIENT
Start: 2022-07-19 | End: 2022-07-19

## 2022-07-19 RX ORDER — ACETAMINOPHEN 500 MG
1 TABLET ORAL
Qty: 0 | Refills: 0 | DISCHARGE

## 2022-07-19 RX ORDER — CALCIUM CARBONATE 500(1250)
2.5 TABLET ORAL
Qty: 225 | Refills: 0
Start: 2022-07-19 | End: 2022-08-17

## 2022-07-19 RX ORDER — CALCITRIOL 0.5 UG/1
0.5 CAPSULE ORAL
Refills: 0 | Status: DISCONTINUED | OUTPATIENT
Start: 2022-07-19 | End: 2022-07-19

## 2022-07-19 RX ORDER — CHOLECALCIFEROL (VITAMIN D3) 125 MCG
0 CAPSULE ORAL
Qty: 0 | Refills: 0 | DISCHARGE

## 2022-07-19 RX ORDER — LEVOTHYROXINE SODIUM 125 MCG
1 TABLET ORAL
Qty: 0 | Refills: 0 | DISCHARGE
Start: 2022-07-19

## 2022-07-19 RX ORDER — CALCITRIOL 0.5 UG/1
1 CAPSULE ORAL
Qty: 0 | Refills: 0 | DISCHARGE
Start: 2022-07-19

## 2022-07-19 RX ADMIN — Medication 2 TABLET(S): at 11:53

## 2022-07-19 RX ADMIN — SODIUM CHLORIDE 75 MILLILITER(S): 9 INJECTION, SOLUTION INTRAVENOUS at 04:02

## 2022-07-19 RX ADMIN — Medication 125 MICROGRAM(S): at 05:20

## 2022-07-19 RX ADMIN — Medication 100 MILLIGRAM(S): at 04:02

## 2022-07-19 RX ADMIN — Medication 4 TABLET(S): at 05:19

## 2022-07-19 RX ADMIN — Medication 4 TABLET(S): at 00:04

## 2022-07-19 RX ADMIN — CALCITRIOL 0.5 MICROGRAM(S): 0.5 CAPSULE ORAL at 11:53

## 2022-07-19 NOTE — DISCHARGE NOTE PROVIDER - HOSPITAL COURSE
47 yof PMH of anemia C/O thyroid nodules that were biopsied and positive for malignancy.  On 7/18/22 she underwent Thyroidectomy. She was admitted overnight for postoperative and calcium monitoring. She currently denies any fevers, chills, sweats, tingling, numbness, muscle spasm.

## 2022-07-19 NOTE — DISCHARGE NOTE NURSING/CASE MANAGEMENT/SOCIAL WORK - PATIENT PORTAL LINK FT
You can access the FollowMyHealth Patient Portal offered by [facility] by registering at the following website: [email or web address]. By joining [facility] FollowMyHealth portal, you will also be able to view your health information using other applications (apps) compatible with our system.

## 2022-07-19 NOTE — DISCHARGE NOTE NURSING/CASE MANAGEMENT/SOCIAL WORK - NSDCPEFALRISK_GEN_ALL_CORE
For information on Fall & Injury Prevention, visit: [email or web address] OR  [email or web address] OR  https://www.cdc.gov/steadi/patient.html

## 2022-07-19 NOTE — DISCHARGE NOTE PROVIDER - NSDCMRMEDTOKEN_GEN_ALL_CORE_FT
calcitriol 0.5 mcg oral capsule: 1 cap(s) orally 2 times a day  calcitriol 0.5 mcg oral capsule: 1 cap(s) orally 2 times a day   levothyroxine 125 mcg (0.125 mg) oral capsule: 1 cap(s) orally once a day   levothyroxine 125 mcg (0.125 mg) oral tablet: 1 tab(s) orally once a day  oxyCODONE 5 mg oral tablet: 1 tab(s) orally every 6 hours, As Needed -for severe pain MDD:4   Tums Ultra 1000 mg oral tablet, chewable: 2.5 tab(s) chewed 3 times a day (after meals)

## 2022-07-19 NOTE — DISCHARGE NOTE PROVIDER - NSDCFUSCHEDAPPT_GEN_ALL_CORE_FT
[facility]  OTOLARYNG [address]  Scheduled Appointment: 07/27/2022    [place]  [facility]  [address]  Scheduled Appointment: 10/11/2022

## 2022-07-19 NOTE — DISCHARGE NOTE PROVIDER - CARE PROVIDER_API CALL
Ryan Myrick)  [facility]; Otolaryngology  [address] [place] [id number]  Phone: [phone]  Fax: [phone]  Follow Up Time:

## 2022-07-19 NOTE — PROGRESS NOTE ADULT - SUBJECTIVE AND OBJECTIVE BOX
s/p Thyroidectomy, total 18-Jul-2022 18:11:02 with right paratracheal lymph node dissection  POD 1, pt admits to pain on the surgical site, denies tingling, numbness, or muscle cramps. denies chest pain, sob, n,v.    Vital Signs Last 24 Hrs  T(C): 36.8 (19 Jul 2022 05:00), Max: 37 (18 Jul 2022 21:00)  T(F): 98.3 (19 Jul 2022 05:00), Max: 98.6 (18 Jul 2022 21:00)  HR: 67 (19 Jul 2022 05:00) (67 - 94)  BP: 123/77 (19 Jul 2022 05:00) (104/74 - 141/78)  BP(mean): --  RR: 20 (19 Jul 2022 05:00) (16 - 20)  SpO2: 94% (19 Jul 2022 05:00) (94% - 100%)    Parameters below as of 19 Jul 2022 05:00  Patient On (Oxygen Delivery Method): room air    Gen: A&O x3 nad  neck: drain ss output less than 10 cc, mild swelling, no hematoma noted, soft, dressing intact.  Abd: soft, nt, nd, ng  : voiding  LE: soft, nontender, no swelling on calfs    Labs: total calcium 7.8 stable from overnight

## 2022-07-20 ENCOUNTER — NON-APPOINTMENT (OUTPATIENT)
Age: 47
End: 2022-07-20

## 2022-07-22 LAB
SURGICAL PATHOLOGY STUDY: SIGNIFICANT CHANGE UP
SURGICAL PATHOLOGY STUDY: SIGNIFICANT CHANGE UP

## 2022-07-27 ENCOUNTER — LABORATORY RESULT (OUTPATIENT)
Age: 47
End: 2022-07-27

## 2022-07-27 ENCOUNTER — APPOINTMENT (OUTPATIENT)
Dept: OTOLARYNGOLOGY | Facility: CLINIC | Age: 47
End: 2022-07-27

## 2022-07-27 PROBLEM — D64.9 ANEMIA, UNSPECIFIED: Chronic | Status: ACTIVE | Noted: 2022-07-06

## 2022-07-27 PROBLEM — Z86.16 PERSONAL HISTORY OF COVID-19: Chronic | Status: ACTIVE | Noted: 2022-07-06

## 2022-07-27 PROCEDURE — 99024 POSTOP FOLLOW-UP VISIT: CPT

## 2022-07-27 NOTE — HISTORY OF PRESENT ILLNESS
[de-identified] : 47  presents is here for post total thyroidectomy and R central neck dissection for met PTCA of thyroid on 7/18/2022.  Pt is without any complaints and denies any pain or discomfort, or voice change. Pt also has no neck mass, no difficulty swallowing and no painful swallowing. path c/w total thyroidectomy  No papillary thyroid carcinoma identified in thyroid, Focal thyroid follicular nodular disease, Focal unremarkable parathyroid tissue. Lymph nodes, right neck paratracheal and pretracheal, dissection, Fragments of lymph node tissue with metastatic papillary thyroid carcinoma. pt is on levothyroxine 125mcg daily, Cathryn 1000mg 2 1/2 tab tid and Calcitrol .5mcg bid. Pt  has an appointment with her endocrinologist, Dr. Lynch on 9/1/2022.

## 2022-07-27 NOTE — REASON FOR VISIT
[Subsequent Evaluation] : a subsequent evaluation for [FreeTextEntry2] : post thyroidectomy and neck dissection

## 2022-07-27 NOTE — CONSULT LETTER
[Dear  ___] : Dear  [unfilled], [Please see my note below.] : Please see my note below. [Sincerely,] : Sincerely, [Courtesy Letter:] : I had the pleasure of seeing your patient, [unfilled], in my office today. [FreeTextEntry2] : Álvaro Brunson MD ([place], [place]) \par  [FreeTextEntry3] : Airam Xavier ([place]) PAC\par Ryan Myrick MD, FACS\par \par    [facility] [place] [facility]\par Associate Chair\par    Department of Otolaryngology\par \par Professor\par Otolaryngology & Molecular Medicine\par [employer]\par

## 2022-07-28 LAB
CALCIUM SERPL-MCNC: 8.8 MG/DL
CALCIUM SERPL-MCNC: 8.8 MG/DL
PARATHYROID HORMONE INTACT: 9 PG/ML

## 2022-08-11 PROCEDURE — C1889: CPT

## 2022-08-11 PROCEDURE — 88305 TISSUE EXAM BY PATHOLOGIST: CPT

## 2022-08-11 PROCEDURE — 88307 TISSUE EXAM BY PATHOLOGIST: CPT

## 2022-08-11 PROCEDURE — 86900 BLOOD TYPING SEROLOGIC ABO: CPT

## 2022-08-11 PROCEDURE — 86901 BLOOD TYPING SEROLOGIC RH(D): CPT

## 2022-08-11 PROCEDURE — 86850 RBC ANTIBODY SCREEN: CPT

## 2022-08-11 PROCEDURE — 36415 COLL VENOUS BLD VENIPUNCTURE: CPT

## 2022-08-11 PROCEDURE — C9399: CPT

## 2022-08-11 PROCEDURE — 83970 ASSAY OF PARATHORMONE: CPT

## 2022-08-11 PROCEDURE — 82310 ASSAY OF CALCIUM: CPT

## 2022-08-16 LAB
25(OH)D3 SERPL-MCNC: 33.6 NG/ML
CA-I SERPL-SCNC: 4.9 MG/DL
CALCIUM SERPL-MCNC: 9.1 MG/DL
CALCIUM SERPL-MCNC: 9.1 MG/DL
PARATHYROID HORMONE INTACT: 10 PG/ML
T3FREE SERPL-MCNC: 2.55 PG/ML
T4 FREE SERPL-MCNC: 1.7 NG/DL
TSH SERPL-ACNC: 1.06 UIU/ML

## 2022-08-23 ENCOUNTER — APPOINTMENT (OUTPATIENT)
Dept: OTOLARYNGOLOGY | Facility: CLINIC | Age: 47
End: 2022-08-23

## 2022-08-23 VITALS
HEART RATE: 90 BPM | BODY MASS INDEX: 35.5 KG/M2 | WEIGHT: 188 LBS | DIASTOLIC BLOOD PRESSURE: 70 MMHG | OXYGEN SATURATION: 98 % | SYSTOLIC BLOOD PRESSURE: 113 MMHG | HEIGHT: 61 IN

## 2022-08-23 PROCEDURE — 99024 POSTOP FOLLOW-UP VISIT: CPT

## 2022-08-23 NOTE — HISTORY OF PRESENT ILLNESS
[None] : No associated symptoms are reported. [de-identified] : s/p total thyroidectomy and Right  central neck dissection for met PTCA of thyroid on 7/18/2022. no primary disease seen in thyroid. Presents for post op follow up. Reports feeling well. Denies pain, dysphagia, dysphonia and or dyspnea. Some throat pain.  \par  recent 8/15/2022 lab  calcium 10  PTH 9.9 Taking  TUMS 1000mg (2tabs) TID and calcitriol 0.5 bid. \par appt. with Dr. Lynch on 9/2. Levothyroxine 125 mcg daily

## 2022-08-23 NOTE — CONSULT LETTER
[Dear  ___] : Dear  [unfilled], [Courtesy Letter:] : I had the pleasure of seeing your patient, [unfilled], in my office today. [Please see my note below.] : Please see my note below. [Sincerely,] : Sincerely, [FreeTextEntry2] : Álvaro Brunson MD ([place], [place]) \par  [FreeTextEntry3] : Ryan Myrick MD, FACS\par \par    [facility] [place]\par Associate Chair\par    Department of Otolaryngology\par \par Professor\par Otolaryngology & Molecular Medicine\par [facility] [employer]\par

## 2022-08-23 NOTE — REASON FOR VISIT
[Initial Evaluation] : an initial evaluation for [FreeTextEntry2] : s/p total thyroidectomy and Right  central neck dissection for met PTCA of thyroid on 7/18/2022.

## 2022-08-30 DIAGNOSIS — N39.46 MIXED INCONTINENCE: ICD-10-CM

## 2022-09-02 ENCOUNTER — APPOINTMENT (OUTPATIENT)
Dept: ENDOCRINOLOGY | Facility: CLINIC | Age: 47
End: 2022-09-02

## 2022-09-02 VITALS
HEIGHT: 61 IN | HEART RATE: 69 BPM | BODY MASS INDEX: 35.5 KG/M2 | DIASTOLIC BLOOD PRESSURE: 70 MMHG | SYSTOLIC BLOOD PRESSURE: 116 MMHG | WEIGHT: 188 LBS

## 2022-09-02 DIAGNOSIS — Z78.9 OTHER SPECIFIED HEALTH STATUS: ICD-10-CM

## 2022-09-02 DIAGNOSIS — Z72.3 LACK OF PHYSICAL EXERCISE: ICD-10-CM

## 2022-09-02 DIAGNOSIS — Z80.0 FAMILY HISTORY OF MALIGNANT NEOPLASM OF DIGESTIVE ORGANS: ICD-10-CM

## 2022-09-02 DIAGNOSIS — Z80.42 FAMILY HISTORY OF MALIGNANT NEOPLASM OF PROSTATE: ICD-10-CM

## 2022-09-02 DIAGNOSIS — Z80.3 FAMILY HISTORY OF MALIGNANT NEOPLASM OF BREAST: ICD-10-CM

## 2022-09-02 PROCEDURE — 99204 OFFICE O/P NEW MOD 45 MIN: CPT

## 2022-09-02 RX ORDER — CALCITRIOL 0.5 UG/1
0.5 CAPSULE, LIQUID FILLED ORAL
Qty: 60 | Refills: 0 | Status: DISCONTINUED | COMMUNITY
Start: 2022-07-19 | End: 2022-09-02

## 2022-09-02 RX ORDER — UBIDECARENONE/VIT E ACET 100MG-5
CAPSULE ORAL
Refills: 0 | Status: DISCONTINUED | COMMUNITY
End: 2022-09-02

## 2022-09-02 NOTE — PHYSICAL EXAM
[Alert] : alert [EOMI] : extra ocular movement intact [No LAD] : no lymphadenopathy [Well Healed Scar] : well healed scar [No Accessory Muscle Use] : no accessory muscle use [Clear to Auscultation] : lungs were clear to auscultation bilaterally [Normal S1, S2] : normal S1 and S2 [Normal Rate] : heart rate was normal [No Edema] : no peripheral edema [Not Tender] : non-tender [Soft] : abdomen soft [Normal Gait] : normal gait [Normal Reflexes] : deep tendon reflexes were 2+ and symmetric [No Tremors] : no tremors [Oriented x3] : oriented to person, place, and time [Normal Affect] : the affect was normal [Normal Insight/Judgement] : insight and judgment were intact [Normal Mood] : the mood was normal

## 2022-09-07 NOTE — REASON FOR VISIT
[Consultation] : a consultation visit [Thyroid Cancer] : thyroid cancer [Interpreters_IDNumber] : [id number] [Interpreters_FullName] : Kiki [FreeTextEntry2] : Dr. Myrick [TWNoteComboBox1] : Wallisian

## 2022-09-07 NOTE — ASSESSMENT
[FreeTextEntry1] : 47 year old female with metastatic Papillary thyroid cancer to cervical lymph nodes without primary tumor in thyroid gland\par - we reviewed importance of thyroid hormone and adherence with dosing and method of administration; risks of nonadherence discussed; we alsi discussed role of TSH suppression in treatment of thyroid cancer\par - goal TSH is suppression, increase LT4 to 137 mcg and change to brand Synthroid, repeat TFTs 4-6 weeks\par - monitor Tg and Tgab levels\par - repeat labs 1 month\par - will ask path dept to review slides again\par - consider treatment with BARBA along w pretreatment scan\par - neck sono later this year\par \par ?post surgical hypoparathyroidism - calcium normal, low PTH levels\par - cont ASHANTI, Calcitriol, weekly vitamin D \par - repeat labs in 4 weeks

## 2022-09-07 NOTE — HISTORY OF PRESENT ILLNESS
[FreeTextEntry1] : Pt seen at the request of Dr. Myrick for thyroid cancer.\par - 10/2021 found to have an abnormal Right cervical lymph node on Breast MRI\par - 11/19/2021 sonogram showing small benign appearing partial cystic nodule in the right thyroid lobe. Otherwise, normal thyroid gland. Right cervical enlarged lymph node level 4 measuring 2.4 cm.\par - 4/2022 Right neck level 4 lymph node FNA biopsy (+) papillary thyroid cancer\par - 5/2022 THyroid sonogram showed no dominant nodule in right thyroid. stable RUP nodule 6 mm and RLP nodule 6x5 mm. Right level 4 neck lymph node.  Left level 1 6 mm rounded lymph node.\par - 6/2022 Left cervical lymph node FNA biopsy: no malignancy, reactive follicular hyperplasia\par \par - 7/18/22 s/p total thyroidectomy and R central neck dissection for met PTCA\par Path: Right neck paratracheal and pretracheal lymph nodes: frangments of lymph node tissue with metastatic papillary thryoid carcinoma.  Thyroid: no papillary thyroid carcinoma identified.  focal thyroid follicular nodular disease, unremarkable parathyroid tissue.\par \par - current meds: Levothyroxine 125 mcg daily (adherent with daily dosing at 5 am and administration), TUMS 2 chew TID, Calcitriol 0.5 mcg BID, Vitamin D weekly

## 2022-09-07 NOTE — REVIEW OF SYSTEMS
ED Nurse Note:



Multiple RNs tried to insert IV access. Dr Guaman and ER charge nurse were 
notified. [Constipation] : constipation [Hair Loss] : hair loss [Recent Weight Gain (___ Lbs)] : no recent weight gain [Recent Weight Loss (___ Lbs)] : no recent weight loss [Dysphagia] : no dysphagia [Dysphonia] : no dysphonia [Palpitations] : no palpitations [Fast Heart Rate] : heart rate is not fast [Shortness Of Breath] : no shortness of breath [Tremors] : no tremors [Depression] : no depression [Anxiety] : no anxiety [Cold Intolerance] : no cold intolerance [Heat Intolerance] : no heat intolerance [FreeTextEntry4] : (+) neck pains (s/p recent surgery) [de-identified] : denies perioral tingling, mild tingling/numbness in hands

## 2022-09-12 ENCOUNTER — LABORATORY RESULT (OUTPATIENT)
Age: 47
End: 2022-09-12

## 2022-09-13 LAB
THYROGLOB AB SERPL-ACNC: <20 IU/ML
THYROGLOB SERPL-MCNC: <0.2 NG/ML

## 2022-09-16 ENCOUNTER — NON-APPOINTMENT (OUTPATIENT)
Age: 47
End: 2022-09-16

## 2022-09-20 ENCOUNTER — RX RENEWAL (OUTPATIENT)
Age: 47
End: 2022-09-20

## 2022-10-04 ENCOUNTER — RX RENEWAL (OUTPATIENT)
Age: 47
End: 2022-10-04

## 2022-10-07 ENCOUNTER — APPOINTMENT (OUTPATIENT)
Dept: ENDOCRINOLOGY | Facility: CLINIC | Age: 47
End: 2022-10-07

## 2022-10-07 VITALS
WEIGHT: 204 LBS | SYSTOLIC BLOOD PRESSURE: 110 MMHG | HEART RATE: 76 BPM | DIASTOLIC BLOOD PRESSURE: 72 MMHG | BODY MASS INDEX: 38.51 KG/M2 | OXYGEN SATURATION: 98 % | HEIGHT: 61 IN

## 2022-10-07 DIAGNOSIS — Z13.1 ENCOUNTER FOR SCREENING FOR DIABETES MELLITUS: ICD-10-CM

## 2022-10-07 PROCEDURE — 99214 OFFICE O/P EST MOD 30 MIN: CPT

## 2022-10-07 NOTE — ASSESSMENT
[FreeTextEntry1] : 47 year old female with metastatic Papillary thyroid cancer to cervical lymph nodes without primary tumor in thyroid gland\par - we reviewed importance of thyroid hormone and adherence with dosing and method of administration; risks of nonadherence discussed; we also discussed role of TSH suppression in treatment of thyroid cancer\par - goal TSH is suppression, continue Synthroid brand 137 mcg \par - monitor Tg and Tgab levels\par - repeat labs 2 weeks \par - will ask path dept to review slides again\par - consider treatment with BARBA along w pretreatment scan\par - neck sono later this year\par \par ?post surgical hypoparathyroidism - calcium normal, low PTH levels\par - cont ASHANTI, Calcitriol, weekly vitamin D \par - repeat labs in 2 weeks\par \par Would like to screen for diabetes with her weight gain, do next labs fasting also\par \par Labs due in 2 weeks\par RTO 12/2022

## 2022-10-07 NOTE — HISTORY OF PRESENT ILLNESS
[FreeTextEntry1] :  Analese [id number]\par \par Post surgical hypothyroidism for PTC\par - Surgeon Dr. Myrick\par - 10/2021 found to have an abnormal Right cervical lymph node on Breast MRI\par - 11/19/2021 sonogram showing small benign appearing partial cystic nodule in the right thyroid lobe. Otherwise, normal thyroid gland. Right cervical enlarged lymph node level 4 measuring 2.4 cm.\par - 4/2022 Right neck level 4 lymph node FNA biopsy (+) papillary thyroid cancer\par - 5/2022 THyroid sonogram showed no dominant nodule in right thyroid. stable RUP nodule 6 mm and RLP nodule 6x5 mm. Right level 4 neck lymph node. Left level 1 6 mm rounded lymph node.\par - 6/2022 Left cervical lymph node FNA biopsy: no malignancy, reactive follicular hyperplasia\par - 7/18/22 s/p total thyroidectomy and R central neck dissection for met PTCA\par Path: Right neck paratracheal and pretracheal lymph nodes: frangments of lymph node tissue with metastatic papillary thryoid carcinoma. Thyroid: no papillary thyroid carcinoma identified. focal thyroid follicular nodular disease, unremarkable parathyroid tissue.\par \par - current meds: Synthroid (JOSÉ MIGUEL) 137 mcg daily (adherent with daily dosing at 5 am and administration), TUMS 2 chew TID, Calcitriol 0.5 mcg BID, Vitamin D weekly \par \par Current TFTs appropriate (TSH 0.54), Thyroglobulin undetectable\par \par Pt has gained approx 15 lbs in one month- states she is very anxious and anxiously eats \par Cannot pinpoint why she is anxious, feels stressed \par A non fasting glucose on last BMP was 160\par Does have acanthosis nigrans

## 2022-10-07 NOTE — REVIEW OF SYSTEMS
[Recent Weight Gain (___ Lbs)] : recent weight gain: [unfilled] lbs [Fatigue] : no fatigue [Recent Weight Loss (___ Lbs)] : no recent weight loss [Dysphagia] : no dysphagia [Neck Pain] : no neck pain [Dysphonia] : no dysphonia [Chest Pain] : no chest pain [Palpitations] : no palpitations [Shortness Of Breath] : no shortness of breath [Constipation] : no constipation [Diarrhea] : no diarrhea [Polyuria] : no polyuria [Polydipsia] : no polydipsia

## 2022-10-07 NOTE — PHYSICAL EXAM
[Alert] : alert [Well Nourished] : well nourished [No Acute Distress] : no acute distress [Normal Sclera/Conjunctiva] : normal sclera/conjunctiva [Normal Hearing] : hearing was normal [Well Healed Scar] : well healed scar [No Accessory Muscle Use] : no accessory muscle use [Clear to Auscultation] : lungs were clear to auscultation bilaterally [Normal S1, S2] : normal S1 and S2 [Normal Rate] : heart rate was normal [No Edema] : no peripheral edema [Not Tender] : non-tender [Soft] : abdomen soft [Normal Gait] : normal gait [No Rash] : no rash [No Tremors] : no tremors [Oriented x3] : oriented to person, place, and time [Acanthosis Nigricans] : acanthosis nigricans present

## 2022-10-11 ENCOUNTER — APPOINTMENT (OUTPATIENT)
Dept: DERMATOLOGY | Facility: CLINIC | Age: 47
End: 2022-10-11

## 2022-10-18 ENCOUNTER — LABORATORY RESULT (OUTPATIENT)
Age: 47
End: 2022-10-18

## 2022-10-28 ENCOUNTER — NON-APPOINTMENT (OUTPATIENT)
Age: 47
End: 2022-10-28

## 2022-10-28 LAB
25(OH)D3 SERPL-MCNC: 34.6 NG/ML
ALBUMIN SERPL ELPH-MCNC: 4 G/DL
ANION GAP SERPL CALC-SCNC: 9 MMOL/L
BUN SERPL-MCNC: 8 MG/DL
CALCIUM SERPL-MCNC: 9.6 MG/DL
CALCIUM SERPL-MCNC: 9.6 MG/DL
CHLORIDE SERPL-SCNC: 101 MMOL/L
CO2 SERPL-SCNC: 27 MMOL/L
CREAT SERPL-MCNC: 0.61 MG/DL
EGFR: 110 ML/MIN/1.73M2
GLUCOSE SERPL-MCNC: 100 MG/DL
PARATHYROID HORMONE INTACT: 16 PG/ML
POTASSIUM SERPL-SCNC: 4.5 MMOL/L
SODIUM SERPL-SCNC: 137 MMOL/L
T4 FREE SERPL-MCNC: 1.7 NG/DL
THYROGLOB AB SERPL-ACNC: <20 IU/ML
THYROGLOB SERPL-MCNC: <0.2 NG/ML
TSH SERPL-ACNC: 0.54 UIU/ML

## 2022-11-02 ENCOUNTER — RESULT CHARGE (OUTPATIENT)
Age: 47
End: 2022-11-02

## 2022-11-02 ENCOUNTER — APPOINTMENT (OUTPATIENT)
Dept: OBGYN | Facility: CLINIC | Age: 47
End: 2022-11-02

## 2022-11-02 VITALS
SYSTOLIC BLOOD PRESSURE: 123 MMHG | BODY MASS INDEX: 36.28 KG/M2 | WEIGHT: 192 LBS | HEART RATE: 80 BPM | DIASTOLIC BLOOD PRESSURE: 83 MMHG

## 2022-11-02 DIAGNOSIS — D64.9 ANEMIA, UNSPECIFIED: ICD-10-CM

## 2022-11-02 LAB — HEMOGLOBIN: 10.4

## 2022-11-02 PROCEDURE — 99214 OFFICE O/P EST MOD 30 MIN: CPT

## 2022-11-02 PROCEDURE — 85018 HEMOGLOBIN: CPT | Mod: QW

## 2022-11-03 NOTE — PLAN
[FreeTextEntry1] : \par \par Discussed single site supracervical hysterectomy bilateral salpingectomy. Explained the fallopian tubes will be removed to allow the ovaries to work better and for cancer prevention.\par Risk of bleeding, infection, injury to bowel and bladder discussed in detail.\par All her questions regarding procedure were discussed in detail\par she is to follow up for final decision for surgery pending covid test, PST, medical clearance and endometrial biopsy, and repeat sonogram at pre op visit.\par see scanned in image\par she is to start aygestin 5mg BID until her surgery date. she verbalizes understanding and is agreeable with plan\par \par \par chacho KNAPP used for  services.\par \par Written by Adalgisa FORD, acting as a scribe for Dr. Leary. This note accurately reflects the work and decisions made by me.\par

## 2022-11-03 NOTE — PHYSICAL EXAM
[Chaperone Present] : A chaperone was present in the examining room during all aspects of the physical examination [Appropriately responsive] : appropriately responsive [Alert] : alert [Labia Majora] : normal [Labia Minora] : normal [Normal] : normal [Enlarged ___ wks] : enlarged [unfilled] ~Uweeks [Uterine Adnexae] : normal [FreeTextEntry1] : Adalgisa GALARZA-FRANCES chaperoned during entire physical exam [FreeTextEntry6] : top mobile

## 2022-11-03 NOTE — HISTORY OF PRESENT ILLNESS
[FreeTextEntry1] : Patient is a 46y/o female here today for surgical consultation. She was sent by Dr. Jimenez for uterine fibroids. She has hx of myomectomy in .  She complains of abnormal uterine bleeding and menorrhagia. Patient was seen by Dr. amin in  to schedule surgery but she had to cancel due to thyroid surgery.\par She had a negative endometrial biopsy by \par Sonogram revealed a large uterine fibroid. \par \par \par \par

## 2022-11-21 ENCOUNTER — RESULT CHARGE (OUTPATIENT)
Age: 47
End: 2022-11-21

## 2022-11-21 ENCOUNTER — APPOINTMENT (OUTPATIENT)
Dept: OBGYN | Facility: CLINIC | Age: 47
End: 2022-11-21

## 2022-11-21 LAB
HCG UR QL: NEGATIVE
QUALITY CONTROL: YES

## 2022-11-21 PROCEDURE — 58100 BIOPSY OF UTERUS LINING: CPT

## 2022-11-21 PROCEDURE — 81025 URINE PREGNANCY TEST: CPT

## 2022-11-22 ENCOUNTER — LABORATORY RESULT (OUTPATIENT)
Age: 47
End: 2022-11-22

## 2022-11-22 ENCOUNTER — NON-APPOINTMENT (OUTPATIENT)
Age: 47
End: 2022-11-22

## 2022-11-28 NOTE — PLAN
[FreeTextEntry1] : \par \par No restrictions at this time.\par she is to continue aygestin BID until her surgery date. \par she is to call me if she has heavy bleeding or any cramping that does not resolve with NSAIDS.\par ALL her questions were answered agreeable with plan.\par \par ct GRAHAM used for \par \par \par \par \par ALEXI Adams FNP-C am scribing for the presence of Dr. Leary the following sections HISTORY OF PRESENT ILLNESS, PAST MEDICAL/FAMILY/SOCIAL HISTORY; REVIEW OF SYSTEMS; VITAL SIGNS; PHYSICAL EXAM; DISPOSITION. \par \par \par I personally performed the services described in the documentation, reviewed the documentation recorded by the scribe in my presence and it accurately and completely records my words and actions.\par

## 2022-11-28 NOTE — ASSESSMENT
[FreeTextEntry1] : Patient is a 48 y/o female here today for endometrial biopsy. She is scheduled for a [facility] BS. for fibroid uterus.  \par Endometrial biopsy completed. Minimal bleeding and cramping noted. patient tolerated well.

## 2022-11-28 NOTE — PROCEDURE
[Endometrial Biopsy] : Endometrial biopsy [Time out performed] : Pre-procedure time out performed.  Patient's name, date of birth and procedure confirmed. [Consent Obtained] : Consent obtained [Irregular Bleeding] : irregular uterine bleeding [Risks] : risks [Benefits] : benefits [Patient] : patient [Infection] : infection [Bleeding] : bleeding [No Premedication] : No premedication [None] : none [Tenaculum] : Tenaculum [Easy Passage] : Easy passage [Scant] : scant [Sent to Pathology] : placed in buffered formalin and sent for pathology [Tolerated Well] : Patient tolerated the procedure well [No Complications] : No complications [de-identified] : pre op for hysterectomy

## 2022-12-02 ENCOUNTER — OUTPATIENT (OUTPATIENT)
Dept: OUTPATIENT SERVICES | Facility: HOSPITAL | Age: 47
LOS: 1 days | End: 2022-12-02
Payer: MEDICAID

## 2022-12-02 VITALS
RESPIRATION RATE: 14 BRPM | TEMPERATURE: 98 F | SYSTOLIC BLOOD PRESSURE: 118 MMHG | HEIGHT: 62 IN | WEIGHT: 190.04 LBS | DIASTOLIC BLOOD PRESSURE: 58 MMHG | HEART RATE: 76 BPM | OXYGEN SATURATION: 100 %

## 2022-12-02 DIAGNOSIS — Z98.890 OTHER SPECIFIED POSTPROCEDURAL STATES: Chronic | ICD-10-CM

## 2022-12-02 DIAGNOSIS — Z01.818 ENCOUNTER FOR OTHER PREPROCEDURAL EXAMINATION: ICD-10-CM

## 2022-12-02 DIAGNOSIS — Z90.49 ACQUIRED ABSENCE OF OTHER SPECIFIED PARTS OF DIGESTIVE TRACT: Chronic | ICD-10-CM

## 2022-12-02 DIAGNOSIS — D21.9 BENIGN NEOPLASM OF CONNECTIVE AND OTHER SOFT TISSUE, UNSPECIFIED: ICD-10-CM

## 2022-12-02 DIAGNOSIS — Z98.891 HISTORY OF UTERINE SCAR FROM PREVIOUS SURGERY: Chronic | ICD-10-CM

## 2022-12-02 DIAGNOSIS — E89.0 POSTPROCEDURAL HYPOTHYROIDISM: Chronic | ICD-10-CM

## 2022-12-02 DIAGNOSIS — N92.0 EXCESSIVE AND FREQUENT MENSTRUATION WITH REGULAR CYCLE: ICD-10-CM

## 2022-12-02 LAB
ANION GAP SERPL CALC-SCNC: 6 MMOL/L — SIGNIFICANT CHANGE UP (ref 5–17)
APTT BLD: 30.3 SEC — SIGNIFICANT CHANGE UP (ref 27.5–35.5)
BASOPHILS # BLD AUTO: 0.06 K/UL — SIGNIFICANT CHANGE UP (ref 0–0.2)
BASOPHILS NFR BLD AUTO: 0.7 % — SIGNIFICANT CHANGE UP (ref 0–2)
BUN SERPL-MCNC: 9 MG/DL — SIGNIFICANT CHANGE UP (ref 7–23)
CALCIUM SERPL-MCNC: 9.6 MG/DL — SIGNIFICANT CHANGE UP (ref 8.5–10.1)
CHLORIDE SERPL-SCNC: 107 MMOL/L — SIGNIFICANT CHANGE UP (ref 96–108)
CO2 SERPL-SCNC: 26 MMOL/L — SIGNIFICANT CHANGE UP (ref 22–31)
CORE LAB BIOPSY: NORMAL
CREAT SERPL-MCNC: 0.8 MG/DL — SIGNIFICANT CHANGE UP (ref 0.5–1.3)
EGFR: 91 ML/MIN/1.73M2 — SIGNIFICANT CHANGE UP
EOSINOPHIL # BLD AUTO: 0.47 K/UL — SIGNIFICANT CHANGE UP (ref 0–0.5)
EOSINOPHIL NFR BLD AUTO: 5.5 % — SIGNIFICANT CHANGE UP (ref 0–6)
GLUCOSE SERPL-MCNC: 175 MG/DL — HIGH (ref 70–99)
HCT VFR BLD CALC: 40.1 % — SIGNIFICANT CHANGE UP (ref 34.5–45)
HGB BLD-MCNC: 12.6 G/DL — SIGNIFICANT CHANGE UP (ref 11.5–15.5)
IMM GRANULOCYTES NFR BLD AUTO: 0.5 % — SIGNIFICANT CHANGE UP (ref 0–0.9)
INR BLD: 1.1 RATIO — SIGNIFICANT CHANGE UP (ref 0.88–1.16)
LYMPHOCYTES # BLD AUTO: 2.4 K/UL — SIGNIFICANT CHANGE UP (ref 1–3.3)
LYMPHOCYTES # BLD AUTO: 27.9 % — SIGNIFICANT CHANGE UP (ref 13–44)
MCHC RBC-ENTMCNC: 26.3 PG — LOW (ref 27–34)
MCHC RBC-ENTMCNC: 31.4 GM/DL — LOW (ref 32–36)
MCV RBC AUTO: 83.7 FL — SIGNIFICANT CHANGE UP (ref 80–100)
MONOCYTES # BLD AUTO: 0.5 K/UL — SIGNIFICANT CHANGE UP (ref 0–0.9)
MONOCYTES NFR BLD AUTO: 5.8 % — SIGNIFICANT CHANGE UP (ref 2–14)
NEUTROPHILS # BLD AUTO: 5.14 K/UL — SIGNIFICANT CHANGE UP (ref 1.8–7.4)
NEUTROPHILS NFR BLD AUTO: 59.6 % — SIGNIFICANT CHANGE UP (ref 43–77)
PLATELET # BLD AUTO: 261 K/UL — SIGNIFICANT CHANGE UP (ref 150–400)
POTASSIUM SERPL-MCNC: 4.2 MMOL/L — SIGNIFICANT CHANGE UP (ref 3.5–5.3)
POTASSIUM SERPL-SCNC: 4.2 MMOL/L — SIGNIFICANT CHANGE UP (ref 3.5–5.3)
PROTHROM AB SERPL-ACNC: 12.8 SEC — SIGNIFICANT CHANGE UP (ref 10.5–13.4)
RBC # BLD: 4.79 M/UL — SIGNIFICANT CHANGE UP (ref 3.8–5.2)
RBC # FLD: 18 % — HIGH (ref 10.3–14.5)
SODIUM SERPL-SCNC: 139 MMOL/L — SIGNIFICANT CHANGE UP (ref 135–145)
WBC # BLD: 8.61 K/UL — SIGNIFICANT CHANGE UP (ref 3.8–10.5)
WBC # FLD AUTO: 8.61 K/UL — SIGNIFICANT CHANGE UP (ref 3.8–10.5)

## 2022-12-02 PROCEDURE — 85025 COMPLETE CBC W/AUTO DIFF WBC: CPT

## 2022-12-02 PROCEDURE — 93010 ELECTROCARDIOGRAM REPORT: CPT

## 2022-12-02 PROCEDURE — 85730 THROMBOPLASTIN TIME PARTIAL: CPT

## 2022-12-02 PROCEDURE — 83036 HEMOGLOBIN GLYCOSYLATED A1C: CPT

## 2022-12-02 PROCEDURE — 99214 OFFICE O/P EST MOD 30 MIN: CPT | Mod: 25

## 2022-12-02 PROCEDURE — 36415 COLL VENOUS BLD VENIPUNCTURE: CPT

## 2022-12-02 PROCEDURE — 85610 PROTHROMBIN TIME: CPT

## 2022-12-02 PROCEDURE — 80048 BASIC METABOLIC PNL TOTAL CA: CPT

## 2022-12-02 PROCEDURE — 86900 BLOOD TYPING SEROLOGIC ABO: CPT

## 2022-12-02 PROCEDURE — 86850 RBC ANTIBODY SCREEN: CPT

## 2022-12-02 PROCEDURE — 93005 ELECTROCARDIOGRAM TRACING: CPT

## 2022-12-02 PROCEDURE — 86901 BLOOD TYPING SEROLOGIC RH(D): CPT

## 2022-12-02 RX ORDER — METRONIDAZOLE 500 MG
500 TABLET ORAL ONCE
Refills: 0 | Status: DISCONTINUED | OUTPATIENT
Start: 2022-12-09 | End: 2022-12-09

## 2022-12-02 NOTE — H&P PST ADULT - PROBLEM SELECTOR PLAN 1
Pre op and chlorhexidine instructions given and explained.  Avoid NSAIDs and OTC supplements.   Patient verbalized understanding  medical consult requested by surgeon  covid 19 swab scheduled 12/7/22, advised to quarantine after test

## 2022-12-02 NOTE — H&P PST ADULT - NSICDXFAMILYHX_GEN_ALL_CORE_FT
FAMILY HISTORY:  Father  Still living? Unknown  FHx: prostate cancer, Age at diagnosis: Age Unknown    Mother  Still living? Unknown  Family history of colon cancer in mother, Age at diagnosis: Age Unknown    Child  Still living? Unknown  FH: asthma, Age at diagnosis: Age Unknown    Grandparent  Still living? Unknown  Family history of stomach cancer, Age at diagnosis: Age Unknown    Aunt  Still living? Unknown  FHx: uterine cancer, Age at diagnosis: Age Unknown

## 2022-12-02 NOTE — H&P PST ADULT - PAIN, FACTORS THAT RELIEVE, PROFILE
Patient is a 77y old  Female who presents with a chief complaint of Anemia (26 Apr 2022 06:19)      BRIEF HOSPITAL COURSE: 78 y/o F with PMH of HTN, DM type 2, CVA, recent Cardiac Arrest, Chronic Respiratory Failure, Vent Dependant s/p trach & PEG, Anemia with GI blood loss, and Dementia who was sent from [facility] facility for acute respiratory distress, fevers, hypotension. Admitted w/ Septic Shock, acute on chronic respiratory failure, RYAN, lactic acidosis       Events last 24 hours: Off pressors. Anemic s/p PRBC transfusions. Sputum culture w/ serratia and pseudomonas. ESBL UTI.       PAST MEDICAL & SURGICAL HISTORY:  Dementia of frontal lobe type    Aphasic stroke    Diabetes mellitus    Respiratory failure    Hypertension    GERD (gastroesophageal reflux disease)    Constipation    Respiratory failure    CVA (cerebral vascular accident)    HTN (hypertension)    DM (diabetes mellitus)    Advanced dementia    COVID-19 virus detected    Quadriplegia    Pneumonia    Type II diabetes mellitus    Hx of appendectomy    Gastrostomy in place    Tracheostomy in place    Tracheostomy tube present    Feeding by G-tube        Review of Systems:  Unable to obtain due to clinical condition       Medications:  piperacillin/tazobactam IVPB.. 3.375 Gram(s) IV Intermittent every 8 hours      ALBUTerol    90 MICROgram(s) HFA Inhaler 2 Puff(s) Inhalation every 6 hours PRN    acetaminophen     Tablet .. 650 milliGRAM(s) Oral every 6 hours PRN  LORazepam     Tablet 1 milliGRAM(s) Oral every 4 hours  LORazepam   Injectable 1 milliGRAM(s) IV Push every 6 hours PRN  methocarbamol 500 milliGRAM(s) Oral two times a day      heparin   Injectable 5000 Unit(s) SubCutaneous every 12 hours    pantoprazole   Suspension 40 milliGRAM(s) Oral daily  polyethylene glycol 3350 17 Gram(s) Oral two times a day  senna 2 Tablet(s) Oral at bedtime  simethicone 80 milliGRAM(s) Chew every 6 hours  sucralfate 1 Gram(s) Oral four times a day      dextrose 50% Injectable 25 Gram(s) IV Push once  dextrose 50% Injectable 12.5 Gram(s) IV Push once  dextrose 50% Injectable 25 Gram(s) IV Push once  dextrose Oral Gel 15 Gram(s) Oral once PRN  glucagon  Injectable 1 milliGRAM(s) IntraMuscular once  insulin lispro (ADMELOG) corrective regimen sliding scale   SubCutaneous every 6 hours    dextrose 5%. 1000 milliLiter(s) IV Continuous <Continuous>  dextrose 5%. 1000 milliLiter(s) IV Continuous <Continuous>  folic acid 1 milliGRAM(s) Oral daily  sodium chloride 0.9% lock flush 10 milliLiter(s) IV Push every 1 hour PRN      chlorhexidine 0.12% Liquid 15 milliLiter(s) Oral Mucosa every 12 hours  chlorhexidine 2% Cloths 1 Application(s) Topical daily  collagenase Ointment 1 Application(s) Topical daily  mineral oil/petrolatum Hydrophilic Ointment 1 Application(s) Topical daily  povidone iodine 10% Ointment 1 Application(s) Topical every 12 hours    lactobacillus acidophilus 1 Tablet(s) Oral two times a day      Mode: AC/ CMV (Assist Control/ Continuous Mandatory Ventilation)  RR (machine): 18  TV (machine): 400  FiO2: 40  PEEP: 2  ITime: 1  MAP: 19  PIP: 36      ICU Vital Signs Last 24 Hrs  T(C): 37.3 (26 Apr 2022 04:07), Max: 38.4 (25 Apr 2022 20:27)  T(F): 99.2 (26 Apr 2022 04:07), Max: 101.1 (25 Apr 2022 20:27)  HR: 101 (26 Apr 2022 05:55) (91 - 113)  BP: 127/75 (26 Apr 2022 04:00) (97/56 - 136/75)  BP(mean): 91 (26 Apr 2022 04:00) (67 - 111)  ABP: 124/56 (25 Apr 2022 10:23) (108/48 - 127/56)  ABP(mean): 1 (25 Apr 2022 10:23) (1 - 83)  RR: 19 (26 Apr 2022 04:00) (16 - 33)  SpO2: 100% (26 Apr 2022 05:55) (94% - 100%)          I&O's Detail    24 Apr 2022 07:01  -  25 Apr 2022 07:00  --------------------------------------------------------  IN:    Enteral Tube Flush: 550 mL    Glucerna 1.5: 1320 mL    IV PiggyBack: 200 mL  Total IN: 2070 mL    OUT:    Indwelling Catheter - Urethral (mL): 1800 mL  Total OUT: 1800 mL    Total NET: 270 mL      25 Apr 2022 07:01  -  26 Apr 2022 06:23  --------------------------------------------------------  IN:    Enteral Tube Flush: 550 mL    Glucerna 1.5: 1320 mL    IV PiggyBack: 100 mL  Total IN: 1970 mL    OUT:    Indwelling Catheter - Urethral (mL): 1500 mL  Total OUT: 1500 mL    Total NET: 470 mL            LABS:                        7.1    6.81  )-----------( 220      ( 25 Apr 2022 05:58 )             23.7     04-25    139  |  103  |  46<H>  ----------------------------<  211<H>  4.3   |  27  |  1.12    Ca    8.6      25 Apr 2022 05:58    TPro  6.6  /  Alb  1.7<L>  /  TBili  0.5  /  DBili  x   /  AST  16  /  ALT  20  /  AlkPhos  209<H>  04-25          CAPILLARY BLOOD GLUCOSE      POCT Blood Glucose.: 203 mg/dL (26 Apr 2022 05:28)        CULTURES:  Culture Results:   Moderate Pseudomonas aeruginosa  Moderate Serratia marcescens  Normal Respiratory Elvira present (04-24-22 @ 12:35)  Culture Results:   Culture yields growth of greater than 3 colony types of  Call client services within 7 days if further workup is clinically  indicated. (04-22-22 @ 20:58)  C Diff by PCR Result: Indeterminate (04-22-22 @ 12:07)  Culture Results:   >100,000 CFU/ml Escherichia coli ESBL (04-21-22 @ 18:36)  Culture Results:   No growth to date. (04-21-22 @ 18:24)  Culture Results:   No growth to date. (04-21-22 @ 18:24)  Rapid RVP Result: NotDetec (04-21-22 @ 13:04)      Physical Examination:    General: No acute distress. Contracted     HEENT: Pupils equal, reactive to light.  Symmetric.    PULM: Coarse breath sounds b/l. No wheeze     CVS: Regular rate and rhythm, no murmurs, rubs, or gallops    ABD: Soft, nondistended, nontender, normoactive bowel sounds. + PEG     EXT: No edema, nontender    SKIN: Warm and well perfused, no rashes noted.    NEURO: Contracted. Not following commands.    RADIOLOGY: < from: Xray Chest 1 View- PORTABLE-Urgent (Xray Chest 1 View- PORTABLE-Urgent .) (04.21.22 @ 18:50) >  ACC: [id number] EXAM:  XR CHEST PORTABLE URGENT 1V                          PROCEDURE DATE:  04/21/2022          INTERPRETATION:  Portable chest radiograph    CLINICAL INFORMATION: Line placement    TECHNIQUE:  Portable  AP chest radiograph.    COMPARISON: 1/6/2022 chest radiograph .    FINDINGS:  CATHETERS AND TUBES: LEFT IJ catheter tip in SVC.  Tracheostomy tube in place.      PULMONARY: Increased perihilar diffuse airspace disease and/or effusions   obscuring LEFT diaphragm contour..   No pneumothorax.    HEART/VASCULAR: The heart and mediastinum size and configuration are   within normal limits.    BONES: Visualized osseous structures are intact.    IMPRESSION:   LEFT IJ catheter tip in SVC.  Increased bilateral perihilar diffuse airspace disease and/or effusions   obscuring LEFT diaphragm contour..    < end of copied text >         medications

## 2022-12-02 NOTE — H&P PST ADULT - NSICDXPASTMEDICALHX_GEN_ALL_CORE_FT
PAST MEDICAL HISTORY:  Anemia     Fibroids     History of 2019 novel coronavirus disease (COVID-19) 3/2020 not resp complications, not hospitalized    Multiple thyroid nodules     Pyelonephritis

## 2022-12-02 NOTE — H&P PST ADULT - ASSESSMENT
46 y/o Pashto speaking presents to PST for scheduled single site hysterectomy on 12/8/22. Patient with heavy menstrual bleeding causing anemia seen by gyn u/s done fibroids noted.

## 2022-12-02 NOTE — H&P PST ADULT - HISTORY OF PRESENT ILLNESS
46 y/o Slovak speaking presents to PST for scheduled single site hysterectomy on 12/8/22. Patient with heavy menstrual bleeding causing anemia seen by gyn u/s done fibroids noted.

## 2022-12-02 NOTE — H&P PST ADULT - NSICDXPASTSURGICALHX_GEN_ALL_CORE_FT
PAST SURGICAL HISTORY:  H/O  section     H/O removal of cyst right upper arm    H/O thyroidectomy     History of cholecystectomy

## 2022-12-03 DIAGNOSIS — N92.0 EXCESSIVE AND FREQUENT MENSTRUATION WITH REGULAR CYCLE: ICD-10-CM

## 2022-12-03 DIAGNOSIS — Z01.818 ENCOUNTER FOR OTHER PREPROCEDURAL EXAMINATION: ICD-10-CM

## 2022-12-03 LAB
A1C WITH ESTIMATED AVERAGE GLUCOSE RESULT: 5.9 % — HIGH (ref 4–5.6)
ESTIMATED AVERAGE GLUCOSE: 123 MG/DL — HIGH (ref 68–114)

## 2022-12-07 ENCOUNTER — ASOB RESULT (OUTPATIENT)
Age: 47
End: 2022-12-07

## 2022-12-07 ENCOUNTER — APPOINTMENT (OUTPATIENT)
Dept: OBGYN | Facility: CLINIC | Age: 47
End: 2022-12-07

## 2022-12-07 ENCOUNTER — LABORATORY RESULT (OUTPATIENT)
Age: 47
End: 2022-12-07

## 2022-12-07 ENCOUNTER — RESULT CHARGE (OUTPATIENT)
Age: 47
End: 2022-12-07

## 2022-12-07 ENCOUNTER — APPOINTMENT (OUTPATIENT)
Dept: ANTEPARTUM | Facility: CLINIC | Age: 47
End: 2022-12-07

## 2022-12-07 VITALS — HEART RATE: 90 BPM | SYSTOLIC BLOOD PRESSURE: 131 MMHG | DIASTOLIC BLOOD PRESSURE: 83 MMHG

## 2022-12-07 LAB — HEMOGLOBIN: 11.7

## 2022-12-07 PROCEDURE — 85018 HEMOGLOBIN: CPT | Mod: QW

## 2022-12-07 PROCEDURE — 76856 US EXAM PELVIC COMPLETE: CPT | Mod: 59

## 2022-12-07 PROCEDURE — 76830 TRANSVAGINAL US NON-OB: CPT

## 2022-12-07 PROCEDURE — 99214 OFFICE O/P EST MOD 30 MIN: CPT | Mod: 57

## 2022-12-08 ENCOUNTER — APPOINTMENT (OUTPATIENT)
Dept: OBGYN | Facility: HOSPITAL | Age: 47
End: 2022-12-08

## 2022-12-08 NOTE — PLAN
[FreeTextEntry1] : \par \par \par \par Discussed pre op and post op instructions in detail.\par Vaginal restrictions only\par all of patients questions regarding procedure were answered.\par consent signed by MD, patient and witness.\par she is to f/u with me 2 weeks post operatively. she is agreeable with plan.\par \par \par \par I Adalgisa GALARZA-C am scribing for the presence of Dr. Leary the following sections HISTORY OF PRESENT ILLNESS, PAST MEDICAL/FAMILY/SOCIAL HISTORY; REVIEW OF SYSTEMS; VITAL SIGNS; PHYSICAL EXAM; DISPOSITION. \par \par \par I personally performed the services described in the documentation, reviewed the documentation recorded by the scribe in my presence and it accurately and completely records my words and actions.\par

## 2022-12-08 NOTE — HISTORY OF PRESENT ILLNESS
[FreeTextEntry1] : patient is a 48 y/o female here today for final decision for surgery. She is scheduled for [facility] BS for fibroid uterus. \par

## 2022-12-08 NOTE — PHYSICAL EXAM
[Chaperone Present] : A chaperone was present in the examining room during all aspects of the physical examination [Appropriately responsive] : appropriately responsive [Alert] : alert [Regular Rate Rhythm] : regular rate rhythm [No Murmurs] : no murmurs [Clear to Auscultation B/L] : clear to auscultation bilaterally [Oriented x3] : oriented x3 [Labia Majora] : normal [Labia Minora] : normal [Normal] : normal [Enlarged ___ wks] : enlarged [unfilled] ~Uweeks [FreeTextEntry1] : Adalgisa GALARZA-FRANCES chaperoned during entire physical exam [FreeTextEntry6] : top mobile

## 2022-12-09 ENCOUNTER — OUTPATIENT (OUTPATIENT)
Dept: INPATIENT UNIT | Facility: HOSPITAL | Age: 47
LOS: 1 days | Discharge: HOME | End: 2022-12-09
Payer: MEDICAID

## 2022-12-09 ENCOUNTER — APPOINTMENT (OUTPATIENT)
Dept: OBGYN | Facility: HOSPITAL | Age: 47
End: 2022-12-09

## 2022-12-09 ENCOUNTER — TRANSCRIPTION ENCOUNTER (OUTPATIENT)
Age: 47
End: 2022-12-09

## 2022-12-09 ENCOUNTER — RESULT REVIEW (OUTPATIENT)
Age: 47
End: 2022-12-09

## 2022-12-09 VITALS
OXYGEN SATURATION: 98 % | HEART RATE: 94 BPM | SYSTOLIC BLOOD PRESSURE: 120 MMHG | TEMPERATURE: 98 F | DIASTOLIC BLOOD PRESSURE: 74 MMHG | RESPIRATION RATE: 18 BRPM

## 2022-12-09 VITALS
HEART RATE: 82 BPM | DIASTOLIC BLOOD PRESSURE: 70 MMHG | SYSTOLIC BLOOD PRESSURE: 117 MMHG | RESPIRATION RATE: 16 BRPM | TEMPERATURE: 99 F | OXYGEN SATURATION: 99 % | WEIGHT: 186.95 LBS | HEIGHT: 62 IN

## 2022-12-09 DIAGNOSIS — E89.0 POSTPROCEDURAL HYPOTHYROIDISM: Chronic | ICD-10-CM

## 2022-12-09 DIAGNOSIS — Z85.850 PERSONAL HISTORY OF MALIGNANT NEOPLASM OF THYROID: ICD-10-CM

## 2022-12-09 DIAGNOSIS — Z98.891 HISTORY OF UTERINE SCAR FROM PREVIOUS SURGERY: Chronic | ICD-10-CM

## 2022-12-09 DIAGNOSIS — E83.51 HYPOCALCEMIA: ICD-10-CM

## 2022-12-09 DIAGNOSIS — Z86.16 PERSONAL HISTORY OF COVID-19: ICD-10-CM

## 2022-12-09 DIAGNOSIS — N92.0 EXCESSIVE AND FREQUENT MENSTRUATION WITH REGULAR CYCLE: ICD-10-CM

## 2022-12-09 DIAGNOSIS — D25.9 LEIOMYOMA OF UTERUS, UNSPECIFIED: ICD-10-CM

## 2022-12-09 DIAGNOSIS — Z90.49 ACQUIRED ABSENCE OF OTHER SPECIFIED PARTS OF DIGESTIVE TRACT: Chronic | ICD-10-CM

## 2022-12-09 DIAGNOSIS — N70.11 CHRONIC SALPINGITIS: ICD-10-CM

## 2022-12-09 DIAGNOSIS — N39.46 MIXED INCONTINENCE: ICD-10-CM

## 2022-12-09 DIAGNOSIS — N73.6 FEMALE PELVIC PERITONEAL ADHESIONS (POSTINFECTIVE): ICD-10-CM

## 2022-12-09 DIAGNOSIS — Z80.3 FAMILY HISTORY OF MALIGNANT NEOPLASM OF BREAST: ICD-10-CM

## 2022-12-09 DIAGNOSIS — Z80.49 FAMILY HISTORY OF MALIGNANT NEOPLASM OF OTHER GENITAL ORGANS: ICD-10-CM

## 2022-12-09 DIAGNOSIS — Z98.890 OTHER SPECIFIED POSTPROCEDURAL STATES: Chronic | ICD-10-CM

## 2022-12-09 DIAGNOSIS — E89.0 POSTPROCEDURAL HYPOTHYROIDISM: ICD-10-CM

## 2022-12-09 DIAGNOSIS — N83.8 OTHER NONINFLAMMATORY DISORDERS OF OVARY, FALLOPIAN TUBE AND BROAD LIGAMENT: ICD-10-CM

## 2022-12-09 LAB — HCG UR QL: NEGATIVE — SIGNIFICANT CHANGE UP

## 2022-12-09 PROCEDURE — 88307 TISSUE EXAM BY PATHOLOGIST: CPT | Mod: 26

## 2022-12-09 PROCEDURE — 58544 LSH W/T/O UTERUS ABOVE 250 G: CPT | Mod: AS

## 2022-12-09 PROCEDURE — 88307 TISSUE EXAM BY PATHOLOGIST: CPT

## 2022-12-09 PROCEDURE — 82962 GLUCOSE BLOOD TEST: CPT

## 2022-12-09 PROCEDURE — 58544 LSH W/T/O UTERUS ABOVE 250 G: CPT | Mod: 22

## 2022-12-09 PROCEDURE — C1889: CPT

## 2022-12-09 PROCEDURE — 81025 URINE PREGNANCY TEST: CPT

## 2022-12-09 RX ORDER — ACETAMINOPHEN 500 MG
1000 TABLET ORAL ONCE
Refills: 0 | Status: DISCONTINUED | OUTPATIENT
Start: 2022-12-09 | End: 2022-12-09

## 2022-12-09 RX ORDER — SODIUM CHLORIDE 9 MG/ML
1000 INJECTION, SOLUTION INTRAVENOUS
Refills: 0 | Status: DISCONTINUED | OUTPATIENT
Start: 2022-12-09 | End: 2022-12-09

## 2022-12-09 RX ORDER — OXYCODONE HYDROCHLORIDE 5 MG/1
5 TABLET ORAL ONCE
Refills: 0 | Status: DISCONTINUED | OUTPATIENT
Start: 2022-12-09 | End: 2022-12-09

## 2022-12-09 RX ORDER — HYDROMORPHONE HYDROCHLORIDE 2 MG/ML
0.5 INJECTION INTRAMUSCULAR; INTRAVENOUS; SUBCUTANEOUS
Refills: 0 | Status: DISCONTINUED | OUTPATIENT
Start: 2022-12-09 | End: 2022-12-09

## 2022-12-09 RX ORDER — CELECOXIB 200 MG/1
400 CAPSULE ORAL ONCE
Refills: 0 | Status: COMPLETED | OUTPATIENT
Start: 2022-12-09 | End: 2022-12-09

## 2022-12-09 RX ORDER — ACETAMINOPHEN 500 MG
1000 TABLET ORAL ONCE
Refills: 0 | Status: COMPLETED | OUTPATIENT
Start: 2022-12-09 | End: 2022-12-09

## 2022-12-09 RX ORDER — ONDANSETRON 8 MG/1
4 TABLET, FILM COATED ORAL ONCE
Refills: 0 | Status: DISCONTINUED | OUTPATIENT
Start: 2022-12-09 | End: 2022-12-09

## 2022-12-09 RX ORDER — FENTANYL CITRATE 50 UG/ML
25 INJECTION INTRAVENOUS
Refills: 0 | Status: DISCONTINUED | OUTPATIENT
Start: 2022-12-09 | End: 2022-12-09

## 2022-12-09 RX ORDER — LEVOTHYROXINE SODIUM 125 MCG
1 TABLET ORAL
Qty: 0 | Refills: 0 | DISCHARGE

## 2022-12-09 RX ORDER — GABAPENTIN 400 MG/1
600 CAPSULE ORAL ONCE
Refills: 0 | Status: COMPLETED | OUTPATIENT
Start: 2022-12-09 | End: 2022-12-09

## 2022-12-09 RX ORDER — FENTANYL CITRATE 50 UG/ML
50 INJECTION INTRAVENOUS
Refills: 0 | Status: DISCONTINUED | OUTPATIENT
Start: 2022-12-09 | End: 2022-12-09

## 2022-12-09 RX ADMIN — OXYCODONE HYDROCHLORIDE 5 MILLIGRAM(S): 5 TABLET ORAL at 16:20

## 2022-12-09 RX ADMIN — Medication 1000 MILLIGRAM(S): at 10:39

## 2022-12-09 RX ADMIN — GABAPENTIN 600 MILLIGRAM(S): 400 CAPSULE ORAL at 10:40

## 2022-12-09 RX ADMIN — CELECOXIB 400 MILLIGRAM(S): 200 CAPSULE ORAL at 10:40

## 2022-12-09 NOTE — BRIEF OPERATIVE NOTE - NSICDXBRIEFPROCEDURE_GEN_ALL_CORE_FT
PROCEDURES:  Exam under anesthesia, gynecologic 09-Dec-2022 10:57:04  Darren Leary  Surgery, laparoscopic, single incision 09-Dec-2022 10:57:46  Darren Leary  Laparoscopic supracervical hysterectomy with cystoscopy 09-Dec-2022 10:58:24  Darren Leary  Bilateral salpingectomy 09-Dec-2022 10:59:31  Darren Leary

## 2022-12-09 NOTE — ASU DISCHARGE PLAN (ADULT/PEDIATRIC) - CARE PROVIDER_API CALL
Darren Leary)  Obstetrics and Gynecology  [address] [place] [address]  Phone: [phone]  Fax: [phone]  Follow Up Time: 2 weeks

## 2022-12-09 NOTE — ASU DISCHARGE PLAN (ADULT/PEDIATRIC) - ASU DC REMOVE DRESSINGFT
71
Ms. Kurtz is an 79F s/p mechanical fall from standing who suffered a L parietal skull fracture as well as a scalp lac requiring staples. Patient was admitted for neuro checks and IV abx. Patient's aspirin was held during her hospital course and restarted upon discharge. Patient's repeat head CT showed no changes in her hematoma or fracture, and patient's neurological status remained intact during hospital stay.

## 2022-12-09 NOTE — BRIEF OPERATIVE NOTE - SPECIMENS
uterine fundus  (   grams); right and left fallopian tubes uterine fundus  (805  grams); right and left fallopian tubes

## 2022-12-09 NOTE — BRIEF OPERATIVE NOTE - OPERATION/FINDINGS
16-18 week myomatous uterus; normal adnexa; normal abdomen; normal bladder 16-18 week myomatous uterus; severe plevic and abdominal adhesions;  bilateral hydrosalpinx with normal ovaries; normal abdomen; normal bladder

## 2022-12-14 ENCOUNTER — APPOINTMENT (OUTPATIENT)
Dept: ENDOCRINOLOGY | Facility: CLINIC | Age: 47
End: 2022-12-14

## 2022-12-14 VITALS
OXYGEN SATURATION: 99 % | BODY MASS INDEX: 35.68 KG/M2 | HEIGHT: 61 IN | WEIGHT: 189 LBS | SYSTOLIC BLOOD PRESSURE: 110 MMHG | HEART RATE: 2 BPM | DIASTOLIC BLOOD PRESSURE: 74 MMHG

## 2022-12-14 PROBLEM — D21.9 BENIGN NEOPLASM OF CONNECTIVE AND OTHER SOFT TISSUE, UNSPECIFIED: Chronic | Status: ACTIVE | Noted: 2022-12-02

## 2022-12-14 PROBLEM — E04.2 NONTOXIC MULTINODULAR GOITER: Chronic | Status: ACTIVE | Noted: 2022-12-02

## 2022-12-14 PROCEDURE — 99214 OFFICE O/P EST MOD 30 MIN: CPT

## 2022-12-14 RX ORDER — CHOLECALCIFEROL (VITAMIN D3) 1250 MCG
1.25 MG CAPSULE ORAL
Qty: 8 | Refills: 1 | Status: DISCONTINUED | COMMUNITY
End: 2022-12-14

## 2022-12-14 RX ORDER — GLUC/MSM/COLGN2/HYAL/ANTIARTH3 375-375-20
TABLET ORAL
Refills: 0 | Status: DISCONTINUED | COMMUNITY
End: 2022-12-14

## 2022-12-14 NOTE — REVIEW OF SYSTEMS
[Recent Weight Loss (___ Lbs)] : recent weight loss: [unfilled] lbs [Constipation] : constipation [Hair Loss] : hair loss [Dysphagia] : no dysphagia [Dysphonia] : no dysphonia [Palpitations] : no palpitations [Fast Heart Rate] : heart rate is not fast [Shortness Of Breath] : no shortness of breath [Diarrhea] : no diarrhea [Irregular Menses] : regular menses [Tremors] : no tremors [Depression] : no depression [Anxiety] : no anxiety [Cold Intolerance] : no cold intolerance [Heat Intolerance] : no heat intolerance [de-identified] : denies perioral tingling, mild tingling/numbness in hands

## 2022-12-14 NOTE — HISTORY OF PRESENT ILLNESS
[FreeTextEntry1] : Pt seen at the request of Dr. Myrick for thyroid cancer.\par - 10/2021 found to have an abnormal Right cervical lymph node on Breast MRI\par - 11/19/2021 sonogram showing small benign appearing partial cystic nodule in the right thyroid lobe. Otherwise, normal thyroid gland. Right cervical enlarged lymph node level 4 measuring 2.4 cm.\par - 4/2022 Right neck level 4 lymph node 2.4 cm FNA biopsy (+) papillary thyroid cancer\par - 5/2022 THyroid sonogram showed no dominant nodule in right thyroid. stable RUP nodule 6 mm and RLP nodule 6x5 mm. Right level 4 neck lymph node.  Left level 1 6 mm rounded lymph node.\par - 6/2022 Left cervical lymph node FNA biopsy: no malignancy, reactive follicular hyperplasia\par \par - 7/18/22 s/p total thyroidectomy and R central neck dissection for met PTCA\par Path: Right neck paratracheal and pretracheal lymph nodes, 4 lymph nodes 3-5 mm: fragments of lymph node tissue with metastatic papillary thyyoid carcinoma.  Thyroid: no papillary thyroid carcinoma identified.  focal thyroid follicular nodular disease, unremarkable parathyroid tissue.\par \par - current meds: Synthroid  150 mcg daily (adherent with daily dosing at 5 am and administration), TUMS 2 chew BID, Calcitriol 0.5 mcg BID\par \par Interval hx - not feeling well, 12/9/22 she underwent BTL, fibroid tumor sp hysterectomy bilateral salpingectomy - having pain in that area and difficult walking , taking pain med

## 2022-12-14 NOTE — ASSESSMENT
[FreeTextEntry1] : 47 year old female with metastatic Papillary thyroid cancer to cervical lymph nodes without primary tumor in thyroid gland, Tg levels low. pathologist reviewed slides again, no primary tumor in thyroid gland\par - goal TSH is suppression, cont Synthroid 150 mcg daily\par - monitor Tg and Tgab levels\par - repeat labs in 2 weeks\par - will ask path dept to review slides again\par - spoke w Dr French, will proceed w WBS first then consider BARBA depending on scan, pt advised to call [facility] and schedule apt, low iodine diet 2 weeks prior to scan (Lao info given to pt)\par - neck sonogram\par \par ?post surgical hypoparathyroidism - calcium normal, normal PTH levels, she is asymptomatic\par - cont TUMS, stop calcitriol\par - repeat labs in 2 weeks\par \par f/y gyn re pain at surgical site

## 2022-12-14 NOTE — REASON FOR VISIT
[Follow - Up] : a follow-up visit [Thyroid Cancer] : thyroid cancer [Source: ______] : History obtained from JOHN [Interpreters_IDNumber] : [id number] [Interpreters_FullName] : Kiki [TWNoteComboBox1] : [place]orese

## 2022-12-23 ENCOUNTER — LABORATORY RESULT (OUTPATIENT)
Age: 47
End: 2022-12-23

## 2022-12-26 LAB
ALBUMIN SERPL ELPH-MCNC: 3.9 G/DL
ANION GAP SERPL CALC-SCNC: 9 MMOL/L
BUN SERPL-MCNC: 13 MG/DL
CALCIUM SERPL-MCNC: 9 MG/DL
CALCIUM SERPL-MCNC: 9 MG/DL
CHLORIDE SERPL-SCNC: 101 MMOL/L
CO2 SERPL-SCNC: 27 MMOL/L
CREAT SERPL-MCNC: 0.65 MG/DL
EGFR: 109 ML/MIN/1.73M2
GLUCOSE SERPL-MCNC: 127 MG/DL
MAGNESIUM SERPL-MCNC: 2 MG/DL
PARATHYROID HORMONE INTACT: 23 PG/ML
PHOSPHATE SERPL-MCNC: 3.1 MG/DL
POTASSIUM SERPL-SCNC: 4.2 MMOL/L
SODIUM SERPL-SCNC: 137 MMOL/L
T4 FREE SERPL-MCNC: 2.4 NG/DL
THYROGLOB AB SERPL-ACNC: <20 IU/ML
THYROGLOB SERPL-MCNC: <0.2 NG/ML
TSH SERPL-ACNC: 0.06 UIU/ML

## 2023-01-01 NOTE — PROGRESS NOTE ADULT - PROBLEM SELECTOR PLAN 1
POD 1  will remove drain   Calcium (Tums increased to 2500mg TID)  Rocaltrol 0.5 microgram BID  repeat Ca @ 1300  discussed with surgeon 1 Principal Discharge DX:	Single liveborn infant delivered vaginally  Secondary Diagnosis:	Infant of diabetic mother  Secondary Diagnosis:	Scalp abrasion of   Secondary Diagnosis:	Single liveborn infant delivered vaginally

## 2023-01-02 ENCOUNTER — APPOINTMENT (OUTPATIENT)
Dept: ULTRASOUND IMAGING | Facility: CLINIC | Age: 48
End: 2023-01-02
Payer: MEDICAID

## 2023-01-02 ENCOUNTER — OUTPATIENT (OUTPATIENT)
Dept: OUTPATIENT SERVICES | Facility: HOSPITAL | Age: 48
LOS: 1 days | End: 2023-01-02

## 2023-01-02 DIAGNOSIS — Z90.49 ACQUIRED ABSENCE OF OTHER SPECIFIED PARTS OF DIGESTIVE TRACT: Chronic | ICD-10-CM

## 2023-01-02 DIAGNOSIS — E89.0 POSTPROCEDURAL HYPOTHYROIDISM: Chronic | ICD-10-CM

## 2023-01-02 DIAGNOSIS — Z98.891 HISTORY OF UTERINE SCAR FROM PREVIOUS SURGERY: Chronic | ICD-10-CM

## 2023-01-02 DIAGNOSIS — Z98.890 OTHER SPECIFIED POSTPROCEDURAL STATES: Chronic | ICD-10-CM

## 2023-01-02 DIAGNOSIS — C73 MALIGNANT NEOPLASM OF THYROID GLAND: ICD-10-CM

## 2023-01-03 PROCEDURE — 76536 US EXAM OF HEAD AND NECK: CPT | Mod: 26

## 2023-01-11 ENCOUNTER — NON-APPOINTMENT (OUTPATIENT)
Age: 48
End: 2023-01-11

## 2023-01-11 DIAGNOSIS — T81.40XA INFECTION FOLLOWING A PROCEDURE, UNSPECIFIED, INITIAL ENCOUNTER: ICD-10-CM

## 2023-01-12 ENCOUNTER — NON-APPOINTMENT (OUTPATIENT)
Age: 48
End: 2023-01-12

## 2023-01-12 LAB
ALBUMIN SERPL ELPH-MCNC: 4.1 G/DL
ANION GAP SERPL CALC-SCNC: 14 MMOL/L
BUN SERPL-MCNC: 9 MG/DL
CALCIUM SERPL-MCNC: 9 MG/DL
CALCIUM SERPL-MCNC: 9 MG/DL
CHLORIDE SERPL-SCNC: 99 MMOL/L
CO2 SERPL-SCNC: 24 MMOL/L
CREAT SERPL-MCNC: 0.63 MG/DL
EGFR: 109 ML/MIN/1.73M2
GLUCOSE SERPL-MCNC: 99 MG/DL
PARATHYROID HORMONE INTACT: 36 PG/ML
POTASSIUM SERPL-SCNC: 4.6 MMOL/L
SODIUM SERPL-SCNC: 137 MMOL/L

## 2023-01-24 ENCOUNTER — RESULT CHARGE (OUTPATIENT)
Age: 48
End: 2023-01-24

## 2023-01-24 ENCOUNTER — APPOINTMENT (OUTPATIENT)
Dept: OBGYN | Facility: CLINIC | Age: 48
End: 2023-01-24
Payer: MEDICAID

## 2023-01-24 VITALS — DIASTOLIC BLOOD PRESSURE: 79 MMHG | HEART RATE: 83 BPM | SYSTOLIC BLOOD PRESSURE: 122 MMHG

## 2023-01-24 DIAGNOSIS — Z00.00 ENCOUNTER FOR GENERAL ADULT MEDICAL EXAMINATION W/OUT ABNORMAL FINDINGS: ICD-10-CM

## 2023-01-24 LAB — HEMOGLOBIN: 11.4

## 2023-01-24 PROCEDURE — 85018 HEMOGLOBIN: CPT | Mod: QW

## 2023-01-24 PROCEDURE — 99024 POSTOP FOLLOW-UP VISIT: CPT

## 2023-01-24 NOTE — PLAN
[None] : None [FreeTextEntry1] : No restrictions at this time\par surgical photos and pathology reviewed \par Will sent update to Dr. Morales \par Pt to follow up with Dr. Morales for annual \par To call here for any questions or concerns regarding the surgery \par All questions answered, pt agreeable with plan\par \par \par \par \par ALEXI BOURNEC am scribing for the presence of Dr. Leary the following sections HISTORY OF PRESENT ILLNESS, PAST MEDICAL/FAMILY/SOCIAL HISTORY; REVIEW OF SYSTEMS; VITAL SIGNS; PHYSICAL EXAM; DISPOSITION. \par \par \par I personally performed the services described in the documentation, reviewed the documentation recorded by the scribe in my presence and it accurately and completely records my words and actions.\par

## 2023-01-24 NOTE — HISTORY OF PRESENT ILLNESS
[Pain is well-controlled] : pain is well-controlled [Clean/Dry/Intact] : clean, dry and intact [None] : no vaginal bleeding [Normal] : normal [Pathology reviewed] : pathology reviewed [Fever] : no fever [Chills] : no chills [Diarrhea] : no diarrhea [Vaginal Bleeding] : no vaginal bleeding [de-identified] : 47 year old female for final post op visit s/p LSH BS for fibroid uterus. pt missed 2 week post op visit and had some drainage and pain at umbilicus. pt was treated with Augmentin x 10 days and symptoms resolved. denies drainage or pain at site today. pt did had a minor vaginal itch while taking antibiotics but symptoms have resolved. Surgical incision has no signs of infection today.

## 2023-01-27 ENCOUNTER — NON-APPOINTMENT (OUTPATIENT)
Age: 48
End: 2023-01-27

## 2023-01-27 LAB
ALBUMIN SERPL ELPH-MCNC: 4 G/DL
ANION GAP SERPL CALC-SCNC: 10 MMOL/L
BUN SERPL-MCNC: 9 MG/DL
CALCIUM SERPL-MCNC: 8.5 MG/DL
CHLORIDE SERPL-SCNC: 101 MMOL/L
CO2 SERPL-SCNC: 26 MMOL/L
CREAT SERPL-MCNC: 0.56 MG/DL
EGFR: 113 ML/MIN/1.73M2
GLUCOSE SERPL-MCNC: 96 MG/DL
POTASSIUM SERPL-SCNC: 4.2 MMOL/L
SODIUM SERPL-SCNC: 138 MMOL/L

## 2023-02-13 ENCOUNTER — APPOINTMENT (OUTPATIENT)
Dept: NUCLEAR MEDICINE | Facility: CLINIC | Age: 48
End: 2023-02-13
Payer: MEDICAID

## 2023-02-13 ENCOUNTER — LABORATORY RESULT (OUTPATIENT)
Age: 48
End: 2023-02-13

## 2023-02-13 ENCOUNTER — OUTPATIENT (OUTPATIENT)
Dept: OUTPATIENT SERVICES | Facility: HOSPITAL | Age: 48
LOS: 1 days | End: 2023-02-13

## 2023-02-13 DIAGNOSIS — Z98.891 HISTORY OF UTERINE SCAR FROM PREVIOUS SURGERY: Chronic | ICD-10-CM

## 2023-02-13 DIAGNOSIS — Z90.49 ACQUIRED ABSENCE OF OTHER SPECIFIED PARTS OF DIGESTIVE TRACT: Chronic | ICD-10-CM

## 2023-02-13 DIAGNOSIS — C73 MALIGNANT NEOPLASM OF THYROID GLAND: ICD-10-CM

## 2023-02-13 DIAGNOSIS — E89.0 POSTPROCEDURAL HYPOTHYROIDISM: Chronic | ICD-10-CM

## 2023-02-13 DIAGNOSIS — Z98.890 OTHER SPECIFIED POSTPROCEDURAL STATES: Chronic | ICD-10-CM

## 2023-02-14 ENCOUNTER — APPOINTMENT (OUTPATIENT)
Dept: NUCLEAR MEDICINE | Facility: CLINIC | Age: 48
End: 2023-02-14

## 2023-02-14 ENCOUNTER — APPOINTMENT (OUTPATIENT)
Dept: OTOLARYNGOLOGY | Facility: CLINIC | Age: 48
End: 2023-02-14

## 2023-02-15 ENCOUNTER — APPOINTMENT (OUTPATIENT)
Dept: NUCLEAR MEDICINE | Facility: CLINIC | Age: 48
End: 2023-02-15

## 2023-02-15 NOTE — ASU PATIENT PROFILE, ADULT - TOBACCO USE
Never smoker PAST SURGICAL HISTORY:  H/O hemorrhoidectomy     History of tonsillectomy     S/P hysterectomy

## 2023-02-17 ENCOUNTER — APPOINTMENT (OUTPATIENT)
Dept: NUCLEAR MEDICINE | Facility: CLINIC | Age: 48
End: 2023-02-17

## 2023-02-17 ENCOUNTER — LABORATORY RESULT (OUTPATIENT)
Age: 48
End: 2023-02-17

## 2023-02-17 ENCOUNTER — RESULT REVIEW (OUTPATIENT)
Age: 48
End: 2023-02-17

## 2023-02-17 PROCEDURE — 78830 RP LOCLZJ TUM SPECT W/CT 1: CPT | Mod: 26

## 2023-02-17 PROCEDURE — 78018 THYROID MET IMAGING BODY: CPT | Mod: 26

## 2023-02-24 ENCOUNTER — APPOINTMENT (OUTPATIENT)
Dept: MAMMOGRAPHY | Facility: CLINIC | Age: 48
End: 2023-02-24

## 2023-02-28 ENCOUNTER — NON-APPOINTMENT (OUTPATIENT)
Age: 48
End: 2023-02-28

## 2023-03-04 ENCOUNTER — OUTPATIENT (OUTPATIENT)
Dept: OUTPATIENT SERVICES | Facility: HOSPITAL | Age: 48
LOS: 1 days | End: 2023-03-04
Payer: MEDICAID

## 2023-03-04 ENCOUNTER — APPOINTMENT (OUTPATIENT)
Dept: MAMMOGRAPHY | Facility: CLINIC | Age: 48
End: 2023-03-04
Payer: MEDICAID

## 2023-03-04 DIAGNOSIS — Z98.891 HISTORY OF UTERINE SCAR FROM PREVIOUS SURGERY: Chronic | ICD-10-CM

## 2023-03-04 DIAGNOSIS — Z90.49 ACQUIRED ABSENCE OF OTHER SPECIFIED PARTS OF DIGESTIVE TRACT: Chronic | ICD-10-CM

## 2023-03-04 DIAGNOSIS — Z98.890 OTHER SPECIFIED POSTPROCEDURAL STATES: Chronic | ICD-10-CM

## 2023-03-04 DIAGNOSIS — E89.0 POSTPROCEDURAL HYPOTHYROIDISM: Chronic | ICD-10-CM

## 2023-03-04 PROCEDURE — 77067 SCR MAMMO BI INCL CAD: CPT

## 2023-03-04 PROCEDURE — 77067 SCR MAMMO BI INCL CAD: CPT | Mod: 26

## 2023-03-04 PROCEDURE — 77063 BREAST TOMOSYNTHESIS BI: CPT

## 2023-03-04 PROCEDURE — 77063 BREAST TOMOSYNTHESIS BI: CPT | Mod: 26

## 2023-03-17 ENCOUNTER — APPOINTMENT (OUTPATIENT)
Dept: ENDOCRINOLOGY | Facility: CLINIC | Age: 48
End: 2023-03-17
Payer: MEDICAID

## 2023-03-17 VITALS
HEART RATE: 78 BPM | SYSTOLIC BLOOD PRESSURE: 118 MMHG | WEIGHT: 192 LBS | HEIGHT: 61 IN | BODY MASS INDEX: 36.25 KG/M2 | OXYGEN SATURATION: 99 % | DIASTOLIC BLOOD PRESSURE: 76 MMHG

## 2023-03-17 DIAGNOSIS — E83.51 HYPOCALCEMIA: ICD-10-CM

## 2023-03-17 PROCEDURE — 99215 OFFICE O/P EST HI 40 MIN: CPT

## 2023-03-17 RX ORDER — FLUTICASONE PROPIONATE 0.5 MG/G
0.05 CREAM TOPICAL
Qty: 1 | Refills: 2 | Status: DISCONTINUED | COMMUNITY
Start: 2022-01-11 | End: 2023-03-17

## 2023-03-17 RX ORDER — CALCITRIOL 0.5 UG/1
0.5 CAPSULE, LIQUID FILLED ORAL
Qty: 180 | Refills: 0 | Status: DISCONTINUED | COMMUNITY
Start: 2022-08-16 | End: 2023-03-17

## 2023-03-17 RX ORDER — NORETHINDRONE ACETATE 5 MG/1
5 TABLET ORAL
Qty: 60 | Refills: 1 | Status: DISCONTINUED | COMMUNITY
Start: 2022-06-21 | End: 2023-03-17

## 2023-03-17 RX ORDER — HYDROQUINONE 40 MG/G
4 CREAM TOPICAL
Qty: 1 | Refills: 3 | Status: DISCONTINUED | COMMUNITY
Start: 2022-01-11 | End: 2023-03-17

## 2023-03-17 RX ORDER — AZITHROMYCIN 250 MG/1
250 TABLET, FILM COATED ORAL
Qty: 1 | Refills: 0 | Status: DISCONTINUED | COMMUNITY
Start: 2022-12-07 | End: 2023-03-17

## 2023-03-17 RX ORDER — AMOXICILLIN AND CLAVULANATE POTASSIUM 500; 125 MG/1; MG/1
500-125 TABLET, FILM COATED ORAL
Qty: 20 | Refills: 0 | Status: DISCONTINUED | COMMUNITY
Start: 2023-01-11 | End: 2023-03-17

## 2023-03-17 RX ORDER — NORETHINDRONE ACETATE 5 MG/1
5 TABLET ORAL
Qty: 90 | Refills: 0 | Status: DISCONTINUED | COMMUNITY
Start: 2022-11-02 | End: 2023-03-17

## 2023-03-17 RX ORDER — CALCIUM CARBONATE 1000 MG/1
TABLET, CHEWABLE ORAL
Refills: 0 | Status: DISCONTINUED | COMMUNITY
End: 2023-03-17

## 2023-03-17 RX ORDER — OXYCODONE 5 MG/1
5 TABLET ORAL
Qty: 8 | Refills: 0 | Status: DISCONTINUED | COMMUNITY
Start: 2022-12-07 | End: 2023-03-17

## 2023-03-17 NOTE — REVIEW OF SYSTEMS
[Constipation] : constipation [Recent Weight Gain (___ Lbs)] : recent weight gain: [unfilled] lbs [As Noted in HPI] : as noted in HPI [Palpitations] : palpitations [Nocturia] : nocturia [Dysphagia] : no dysphagia [Dysphonia] : no dysphonia [Fast Heart Rate] : heart rate is not fast [Shortness Of Breath] : no shortness of breath [Diarrhea] : no diarrhea [Polyuria] : no polyuria [Tremors] : no tremors [Depression] : no depression [Anxiety] : no anxiety [Polydipsia] : no polydipsia [Cold Intolerance] : no cold intolerance [Heat Intolerance] : no heat intolerance [Hot Flashes] : no hot flashes [FreeTextEntry8] : s/p hysterectomy [de-identified] : denies perioral tingling, mild tingling/numbness in hands when holding something, denies carpal spasms

## 2023-03-17 NOTE — REASON FOR VISIT
[Follow - Up] : a follow-up visit [Thyroid Cancer] : thyroid cancer [Source: ______] : History obtained from JOHN [Interpreters_IDNumber] : [id number] [Interpreters_FullName] : Kiik [TWNoteComboBox1] : Turkish

## 2023-03-17 NOTE — DATA REVIEWED
[FreeTextEntry1] : Labs 12/2/22\par ca 9.6\par A1c 5.9\par \par Labs from PCP 2/2/23\par A1c 6.4, Gluc 108\par TSH 0.04\par Ca 8.4, alb 4.1, vit D 27`

## 2023-03-17 NOTE — HISTORY OF PRESENT ILLNESS
[FreeTextEntry1] : Pt seen at the request of Dr. Myrick for thyroid cancer.\par - 10/2021 found to have an abnormal Right cervical lymph node on Breast MRI\par - 11/19/2021 sonogram showing small benign appearing partial cystic nodule in the right thyroid lobe. Otherwise, normal thyroid gland. Right cervical enlarged lymph node level 4 measuring 2.4 cm.\par - 4/2022 Right neck level 4 lymph node 2.4 cm FNA biopsy (+) papillary thyroid cancer\par - 5/2022 THyroid sonogram showed no dominant nodule in right thyroid. stable RUP nodule 6 mm and RLP nodule 6x5 mm. Right level 4 neck lymph node.  Left level 1 6 mm rounded lymph node.\par - 6/2022 Left cervical lymph node FNA biopsy: no malignancy, reactive follicular hyperplasia\par \par - 7/18/22 s/p total thyroidectomy and R central neck dissection for met PTCA\par Path: Right neck paratracheal and pretracheal lymph nodes, 4 lymph nodes 3-5 mm: fragments of lymph node tissue with metastatic papillary thyyoid carcinoma.  Thyroid: no papillary thyroid carcinoma identified.  focal thyroid follicular nodular disease, unremarkable parathyroid tissue.\par \par Neck sono 1/3/23 - no abnormal lymph nodes\par \par 2/17/23 Thyrogen WBS: \par FINDINGS: There is radioiodine accumulation in the anterior neck/thyroid bed. There is physiologic distribution of radiopharmaceutical in the remainder of the visualized structures.\par \par The 48 hour neck I-131 uptake is 4%.\par \par IMPRESSION: Thyrogen stimulated whole body I-131 scan demonstrates:\par Radioiodine avid tissue in the anterior neck/thyroid bed.\par No evidence of distant functioning metastases.\par 48 hour neck I-131 uptake is 4%.\par Thyrogen stimulated thyroglobulin level on 2/17/2023 was 7.37 ng/mL; thyroglobulin antibody  was < 20 IU/mL.\par \par Interval hx - 12/9/22 she underwent BTL, fibroid tumor sp hysterectomy bilateral salpingectomy. complains of intermittent Right posterior neck that radiated to right should. Has been off TUMS and calcitriol since 12/2022\par \par - current meds: Synthroid  150 mcg daily (adherent with daily dosing at 5 am and administration)

## 2023-03-17 NOTE — ASSESSMENT
[FreeTextEntry1] : 47 year old female with metastatic Papillary thyroid cancer to cervical lymph nodes without primary tumor in thyroid gland, Tg levels low. pathologist reviewed slides again, no primary tumor in thyroid gland. neck sonogram without abnormal lymph nodes. Thyrogen Stim Tg 7.37 and Thyrogen WBS with 4% uptake in neck\par - goal TSH is suppression, cont Synthroid 150 mcg daily\par - monitor Tg and Tgab levels\par - planning for BARBA ablation nect month, will d/w Dr French re PET/CT prior to BARBA to look for ectopic tumor\par \par posterior neck pain - nothing on exam, lpossible related to Cspine or muscle spasm, advised PCP f/u\par \par PreDM - A1c 6.4%, counseled pt on low carb diet and increase CV exercise, diet info given\par \par vit D def - start daily OYC vitamin D3 1000 units daily\par \par above reviewed with pt and explained results of blood test and scan to patient, will cont to monitor

## 2023-03-20 ENCOUNTER — LABORATORY RESULT (OUTPATIENT)
Age: 48
End: 2023-03-20

## 2023-03-22 LAB
THYROGLOB AB SERPL-ACNC: <20 IU/ML
THYROGLOB SERPL-MCNC: 1 NG/ML

## 2023-04-04 ENCOUNTER — APPOINTMENT (OUTPATIENT)
Dept: NUCLEAR MEDICINE | Facility: CLINIC | Age: 48
End: 2023-04-04
Payer: MEDICAID

## 2023-04-04 ENCOUNTER — LABORATORY RESULT (OUTPATIENT)
Age: 48
End: 2023-04-04

## 2023-04-04 ENCOUNTER — OUTPATIENT (OUTPATIENT)
Dept: OUTPATIENT SERVICES | Facility: HOSPITAL | Age: 48
LOS: 1 days | End: 2023-04-04

## 2023-04-04 DIAGNOSIS — Z98.890 OTHER SPECIFIED POSTPROCEDURAL STATES: Chronic | ICD-10-CM

## 2023-04-04 DIAGNOSIS — Z98.891 HISTORY OF UTERINE SCAR FROM PREVIOUS SURGERY: Chronic | ICD-10-CM

## 2023-04-04 DIAGNOSIS — Z00.8 ENCOUNTER FOR OTHER GENERAL EXAMINATION: ICD-10-CM

## 2023-04-05 ENCOUNTER — APPOINTMENT (OUTPATIENT)
Dept: NUCLEAR MEDICINE | Facility: CLINIC | Age: 48
End: 2023-04-05

## 2023-04-06 ENCOUNTER — APPOINTMENT (OUTPATIENT)
Dept: NUCLEAR MEDICINE | Facility: CLINIC | Age: 48
End: 2023-04-06

## 2023-04-06 ENCOUNTER — RESULT REVIEW (OUTPATIENT)
Age: 48
End: 2023-04-06

## 2023-04-06 PROCEDURE — 79005 NUCLEAR RX ORAL ADMIN: CPT | Mod: 26

## 2023-04-13 ENCOUNTER — RESULT REVIEW (OUTPATIENT)
Age: 48
End: 2023-04-13

## 2023-04-13 ENCOUNTER — APPOINTMENT (OUTPATIENT)
Dept: NUCLEAR MEDICINE | Facility: CLINIC | Age: 48
End: 2023-04-13
Payer: MEDICAID

## 2023-04-13 ENCOUNTER — OUTPATIENT (OUTPATIENT)
Dept: OUTPATIENT SERVICES | Facility: HOSPITAL | Age: 48
LOS: 1 days | End: 2023-04-13

## 2023-04-13 DIAGNOSIS — Z90.49 ACQUIRED ABSENCE OF OTHER SPECIFIED PARTS OF DIGESTIVE TRACT: Chronic | ICD-10-CM

## 2023-04-13 DIAGNOSIS — E89.0 POSTPROCEDURAL HYPOTHYROIDISM: Chronic | ICD-10-CM

## 2023-04-13 DIAGNOSIS — Z00.8 ENCOUNTER FOR OTHER GENERAL EXAMINATION: ICD-10-CM

## 2023-04-13 DIAGNOSIS — Z98.891 HISTORY OF UTERINE SCAR FROM PREVIOUS SURGERY: Chronic | ICD-10-CM

## 2023-04-13 PROCEDURE — 78830 RP LOCLZJ TUM SPECT W/CT 1: CPT | Mod: 26

## 2023-04-13 PROCEDURE — 78018 THYROID MET IMAGING BODY: CPT | Mod: 26

## 2023-04-24 ENCOUNTER — EMERGENCY (EMERGENCY)
Facility: HOSPITAL | Age: 48
LOS: 0 days | Discharge: HOME | End: 2023-04-24
Attending: EMERGENCY MEDICINE
Payer: MEDICAID

## 2023-04-24 VITALS
TEMPERATURE: 98 F | DIASTOLIC BLOOD PRESSURE: 56 MMHG | SYSTOLIC BLOOD PRESSURE: 104 MMHG | HEART RATE: 66 BPM | RESPIRATION RATE: 16 BRPM | OXYGEN SATURATION: 100 %

## 2023-04-24 VITALS — WEIGHT: 184.09 LBS | HEIGHT: 60 IN

## 2023-04-24 DIAGNOSIS — C37 MALIGNANT NEOPLASM OF THYMUS: ICD-10-CM

## 2023-04-24 DIAGNOSIS — D64.9 ANEMIA, UNSPECIFIED: ICD-10-CM

## 2023-04-24 DIAGNOSIS — Z86.16 PERSONAL HISTORY OF COVID-19: ICD-10-CM

## 2023-04-24 DIAGNOSIS — R10.11 RIGHT UPPER QUADRANT PAIN: ICD-10-CM

## 2023-04-24 DIAGNOSIS — Z87.59 PERSONAL HISTORY OF OTHER COMPLICATIONS OF PREGNANCY, CHILDBIRTH AND THE PUERPERIUM: ICD-10-CM

## 2023-04-24 DIAGNOSIS — Z90.49 ACQUIRED ABSENCE OF OTHER SPECIFIED PARTS OF DIGESTIVE TRACT: Chronic | ICD-10-CM

## 2023-04-24 DIAGNOSIS — K83.8 OTHER SPECIFIED DISEASES OF BILIARY TRACT: ICD-10-CM

## 2023-04-24 DIAGNOSIS — R42 DIZZINESS AND GIDDINESS: ICD-10-CM

## 2023-04-24 DIAGNOSIS — Z87.42 PERSONAL HISTORY OF OTHER DISEASES OF THE FEMALE GENITAL TRACT: ICD-10-CM

## 2023-04-24 DIAGNOSIS — E89.0 POSTPROCEDURAL HYPOTHYROIDISM: Chronic | ICD-10-CM

## 2023-04-24 DIAGNOSIS — R35.0 FREQUENCY OF MICTURITION: ICD-10-CM

## 2023-04-24 DIAGNOSIS — Z98.891 HISTORY OF UTERINE SCAR FROM PREVIOUS SURGERY: Chronic | ICD-10-CM

## 2023-04-24 DIAGNOSIS — Z98.890 OTHER SPECIFIED POSTPROCEDURAL STATES: Chronic | ICD-10-CM

## 2023-04-24 DIAGNOSIS — K76.0 FATTY (CHANGE OF) LIVER, NOT ELSEWHERE CLASSIFIED: ICD-10-CM

## 2023-04-24 DIAGNOSIS — Z90.49 ACQUIRED ABSENCE OF OTHER SPECIFIED PARTS OF DIGESTIVE TRACT: ICD-10-CM

## 2023-04-24 DIAGNOSIS — R11.10 VOMITING, UNSPECIFIED: ICD-10-CM

## 2023-04-24 LAB
ALBUMIN SERPL ELPH-MCNC: 3.5 G/DL — SIGNIFICANT CHANGE UP (ref 3.3–5)
ALP SERPL-CCNC: 108 U/L — SIGNIFICANT CHANGE UP (ref 40–120)
ALT FLD-CCNC: 42 U/L — SIGNIFICANT CHANGE UP (ref 12–78)
ANION GAP SERPL CALC-SCNC: 2 MMOL/L — LOW (ref 5–17)
APPEARANCE UR: CLEAR — SIGNIFICANT CHANGE UP
AST SERPL-CCNC: 46 U/L — HIGH (ref 15–37)
BASOPHILS # BLD AUTO: 0.06 K/UL — SIGNIFICANT CHANGE UP (ref 0–0.2)
BASOPHILS NFR BLD AUTO: 0.5 % — SIGNIFICANT CHANGE UP (ref 0–2)
BILIRUB SERPL-MCNC: 0.5 MG/DL — SIGNIFICANT CHANGE UP (ref 0.2–1.2)
BILIRUB UR-MCNC: NEGATIVE — SIGNIFICANT CHANGE UP
BUN SERPL-MCNC: 10 MG/DL — SIGNIFICANT CHANGE UP (ref 7–23)
CALCIUM SERPL-MCNC: 8.4 MG/DL — LOW (ref 8.5–10.1)
CHLORIDE SERPL-SCNC: 107 MMOL/L — SIGNIFICANT CHANGE UP (ref 96–108)
CO2 SERPL-SCNC: 25 MMOL/L — SIGNIFICANT CHANGE UP (ref 22–31)
COLOR SPEC: YELLOW — SIGNIFICANT CHANGE UP
CREAT SERPL-MCNC: 0.63 MG/DL — SIGNIFICANT CHANGE UP (ref 0.5–1.3)
DIFF PNL FLD: NEGATIVE — SIGNIFICANT CHANGE UP
EGFR: 109 ML/MIN/1.73M2 — SIGNIFICANT CHANGE UP
EOSINOPHIL # BLD AUTO: 0.08 K/UL — SIGNIFICANT CHANGE UP (ref 0–0.5)
EOSINOPHIL NFR BLD AUTO: 0.6 % — SIGNIFICANT CHANGE UP (ref 0–6)
GLUCOSE SERPL-MCNC: 124 MG/DL — HIGH (ref 70–99)
GLUCOSE UR QL: NEGATIVE — SIGNIFICANT CHANGE UP
HCT VFR BLD CALC: 38.4 % — SIGNIFICANT CHANGE UP (ref 34.5–45)
HGB BLD-MCNC: 12.8 G/DL — SIGNIFICANT CHANGE UP (ref 11.5–15.5)
IMM GRANULOCYTES NFR BLD AUTO: 0.5 % — SIGNIFICANT CHANGE UP (ref 0–0.9)
KETONES UR-MCNC: NEGATIVE — SIGNIFICANT CHANGE UP
LEUKOCYTE ESTERASE UR-ACNC: NEGATIVE — SIGNIFICANT CHANGE UP
LIDOCAIN IGE QN: 86 U/L — SIGNIFICANT CHANGE UP (ref 73–393)
LYMPHOCYTES # BLD AUTO: 1.46 K/UL — SIGNIFICANT CHANGE UP (ref 1–3.3)
LYMPHOCYTES # BLD AUTO: 11 % — LOW (ref 13–44)
MCHC RBC-ENTMCNC: 27.6 PG — SIGNIFICANT CHANGE UP (ref 27–34)
MCHC RBC-ENTMCNC: 33.3 GM/DL — SIGNIFICANT CHANGE UP (ref 32–36)
MCV RBC AUTO: 82.8 FL — SIGNIFICANT CHANGE UP (ref 80–100)
MONOCYTES # BLD AUTO: 0.62 K/UL — SIGNIFICANT CHANGE UP (ref 0–0.9)
MONOCYTES NFR BLD AUTO: 4.7 % — SIGNIFICANT CHANGE UP (ref 2–14)
NEUTROPHILS # BLD AUTO: 11.03 K/UL — HIGH (ref 1.8–7.4)
NEUTROPHILS NFR BLD AUTO: 82.7 % — HIGH (ref 43–77)
NITRITE UR-MCNC: NEGATIVE — SIGNIFICANT CHANGE UP
PH UR: 6 — SIGNIFICANT CHANGE UP (ref 5–8)
PLATELET # BLD AUTO: 249 K/UL — SIGNIFICANT CHANGE UP (ref 150–400)
POTASSIUM SERPL-MCNC: 5 MMOL/L — SIGNIFICANT CHANGE UP (ref 3.5–5.3)
POTASSIUM SERPL-SCNC: 5 MMOL/L — SIGNIFICANT CHANGE UP (ref 3.5–5.3)
PROT SERPL-MCNC: 7.8 GM/DL — SIGNIFICANT CHANGE UP (ref 6–8.3)
PROT UR-MCNC: NEGATIVE — SIGNIFICANT CHANGE UP
RBC # BLD: 4.64 M/UL — SIGNIFICANT CHANGE UP (ref 3.8–5.2)
RBC # FLD: 13.3 % — SIGNIFICANT CHANGE UP (ref 10.3–14.5)
SODIUM SERPL-SCNC: 134 MMOL/L — LOW (ref 135–145)
SP GR SPEC: 1.01 — SIGNIFICANT CHANGE UP (ref 1.01–1.02)
UROBILINOGEN FLD QL: NEGATIVE — SIGNIFICANT CHANGE UP
WBC # BLD: 13.32 K/UL — HIGH (ref 3.8–10.5)
WBC # FLD AUTO: 13.32 K/UL — HIGH (ref 3.8–10.5)

## 2023-04-24 PROCEDURE — 85025 COMPLETE CBC W/AUTO DIFF WBC: CPT

## 2023-04-24 PROCEDURE — 99284 EMERGENCY DEPT VISIT MOD MDM: CPT | Mod: 25

## 2023-04-24 PROCEDURE — 80053 COMPREHEN METABOLIC PANEL: CPT

## 2023-04-24 PROCEDURE — 99284 EMERGENCY DEPT VISIT MOD MDM: CPT

## 2023-04-24 PROCEDURE — 96375 TX/PRO/DX INJ NEW DRUG ADDON: CPT

## 2023-04-24 PROCEDURE — 76705 ECHO EXAM OF ABDOMEN: CPT | Mod: 26

## 2023-04-24 PROCEDURE — 81003 URINALYSIS AUTO W/O SCOPE: CPT

## 2023-04-24 PROCEDURE — 76705 ECHO EXAM OF ABDOMEN: CPT

## 2023-04-24 PROCEDURE — 83690 ASSAY OF LIPASE: CPT

## 2023-04-24 PROCEDURE — 36415 COLL VENOUS BLD VENIPUNCTURE: CPT

## 2023-04-24 PROCEDURE — 96365 THER/PROPH/DIAG IV INF INIT: CPT

## 2023-04-24 RX ORDER — ACETAMINOPHEN 500 MG
1000 TABLET ORAL ONCE
Refills: 0 | Status: COMPLETED | OUTPATIENT
Start: 2023-04-24 | End: 2023-04-24

## 2023-04-24 RX ORDER — FAMOTIDINE 10 MG/ML
1 INJECTION INTRAVENOUS
Qty: 7 | Refills: 0
Start: 2023-04-24 | End: 2023-04-30

## 2023-04-24 RX ORDER — FAMOTIDINE 10 MG/ML
20 INJECTION INTRAVENOUS ONCE
Refills: 0 | Status: COMPLETED | OUTPATIENT
Start: 2023-04-24 | End: 2023-04-24

## 2023-04-24 RX ORDER — SODIUM CHLORIDE 9 MG/ML
1000 INJECTION INTRAMUSCULAR; INTRAVENOUS; SUBCUTANEOUS ONCE
Refills: 0 | Status: COMPLETED | OUTPATIENT
Start: 2023-04-24 | End: 2023-04-24

## 2023-04-24 RX ADMIN — Medication 1000 MILLIGRAM(S): at 17:33

## 2023-04-24 RX ADMIN — Medication 400 MILLIGRAM(S): at 16:51

## 2023-04-24 RX ADMIN — SODIUM CHLORIDE 1000 MILLILITER(S): 9 INJECTION INTRAMUSCULAR; INTRAVENOUS; SUBCUTANEOUS at 17:33

## 2023-04-24 RX ADMIN — SODIUM CHLORIDE 1000 MILLILITER(S): 9 INJECTION INTRAMUSCULAR; INTRAVENOUS; SUBCUTANEOUS at 16:51

## 2023-04-24 RX ADMIN — FAMOTIDINE 20 MILLIGRAM(S): 10 INJECTION INTRAVENOUS at 17:33

## 2023-04-24 NOTE — ED ADULT NURSE NOTE - OBJECTIVE STATEMENT
Patient presents to the ER with complaints of right sided abdominal pain radiating to flank associated with multiple bowel movements, frequently urinating, vomiting and dizziness since 1200 today. Patient states she feels "gassy."  Teri used ID [id number].

## 2023-04-24 NOTE — ED STATDOCS - CLINICAL SUMMARY MEDICAL DECISION MAKING FREE TEXT BOX
Patient with hx of cholecystectomy undergoing thyroid ca tx presents to the ED with RUQ pain. On exam with RUQ tenderness. Plan for labs, US, urine and reassess.

## 2023-04-24 NOTE — ED STATDOCS - ATTENDING APP SHARED VISIT CONTRIBUTION OF CARE
I personally saw the patient with the ERNESTINA, and completed the key components of the history and physical exam. I then discussed the management plan with the ERNESTINA.

## 2023-04-24 NOTE — ED ADULT NURSE NOTE - CAS TRG GENERAL AIRWAY, MLM
Called, spoke to pt. Two pt identifiers confirmed. Pt informed per Dr. Fernandez Dietrich that New Pt scheduled for 12/13/17 may not show and if not, pt will be contacted to come in for new pt appt. Pt states she will be available to come in. Pt advised LPN RR will contact pt in the morning to confirm whether or not pt can be seen. Pt verbalized understanding of information discussed w/ no further questions at this time. Patent

## 2023-04-24 NOTE — ED ADULT NURSE NOTE - HOW OFTEN DO YOU HAVE A DRINK CONTAINING ALCOHOL?
Never

## 2023-04-24 NOTE — ED STATDOCS - PROGRESS NOTE DETAILS
pt aware of her us and lab results, pt has relief of her symptoms with meds given. pt aware to fu with GI and pmd and knows to return to ED for any worsening of symptoms. pt well appearing on dc. -Pat Walter PA-C

## 2023-04-24 NOTE — ED STATDOCS - SKIN, MLM
Patient sent in from PMD for CXR. Patient with fevers starting today 104F, denies any N/V/D, IUTD, drinking well as per mother. Patient awake, alert, hot to touch, BCR noted, breath sounds clear bilaterally.
skin normal color for race, warm, dry and intact.

## 2023-04-24 NOTE — ED STATDOCS - OBJECTIVE STATEMENT
47 year old female with hx of cholecystectomy x13 years ago and thyroid CA undergoing radiation presents to the ED c/o RUQ abd pain radiating to back, vomiting, dizziness that started today. Endorses increased urinary frequency. Denies fever.

## 2023-04-24 NOTE — ED STATDOCS - CARE PROVIDER_API CALL
Charlie Grimm)  Gastroenterology  [address] [place] [address]  Phone: [phone]  Fax: [phone]  Follow Up Time: Urgent

## 2023-04-24 NOTE — ED STATDOCS - PATIENT PORTAL LINK FT
You can access the FollowMyHealth Patient Portal offered by [facility] by registering at the following website: [email or web address]. By joining [facility]’s FollowMyHealth portal, you will also be able to view your health information using other applications (apps) compatible with our system.

## 2023-04-24 NOTE — ED ADULT NURSE NOTE - CHIEF COMPLAINT QUOTE
since 1200 this afternoon, pt has RUQ abdominal pain radiating to right flank associated with dizziness, vomiting and entire body shaking. pt denies fevers or urinary symptoms. pt does not menstruate, hx salpingectomy. pt currently undergoing radiation for thyroid cancer.

## 2023-05-04 ENCOUNTER — APPOINTMENT (OUTPATIENT)
Dept: GASTROENTEROLOGY | Facility: CLINIC | Age: 48
End: 2023-05-04
Payer: MEDICAID

## 2023-05-04 ENCOUNTER — LABORATORY RESULT (OUTPATIENT)
Age: 48
End: 2023-05-04

## 2023-05-04 VITALS
WEIGHT: 190 LBS | HEIGHT: 61 IN | BODY MASS INDEX: 35.87 KG/M2 | HEART RATE: 84 BPM | SYSTOLIC BLOOD PRESSURE: 92 MMHG | DIASTOLIC BLOOD PRESSURE: 70 MMHG

## 2023-05-04 DIAGNOSIS — Z12.11 ENCOUNTER FOR SCREENING FOR MALIGNANT NEOPLASM OF COLON: ICD-10-CM

## 2023-05-04 DIAGNOSIS — K76.0 FATTY (CHANGE OF) LIVER, NOT ELSEWHERE CLASSIFIED: ICD-10-CM

## 2023-05-04 DIAGNOSIS — Z80.0 FAMILY HISTORY OF MALIGNANT NEOPLASM OF DIGESTIVE ORGANS: ICD-10-CM

## 2023-05-04 PROCEDURE — 99204 OFFICE O/P NEW MOD 45 MIN: CPT

## 2023-05-04 PROCEDURE — T1013: CPT

## 2023-05-04 NOTE — ASSESSMENT
[FreeTextEntry1] : Plan:\par Will order additional labs to evaluate for underlying causes for hepatic steatosis, although informed her abdominal pain was unrelated. Discussed importance of dietary and lifestyle modifications to lose weight. Would also recommend colonoscopy per age related guidelines since pt has + FMH of CRC. Risks versus benefits as well as instructions reviewed, pt agrees to planned procedure. All questions answered, pt expressed understanding. Discussed with Dr. Macdonald, I have spent 45 minutes on this encounter.

## 2023-05-04 NOTE — REASON FOR VISIT
[Initial Evaluation] : an initial evaluation [[employer] ] : provided by [employer]   [Time Spent: ____ minutes] : Total time spent using  services: [unfilled] minutes. The patient's primary language is not English thus required  services. [Interpreters_IDNumber] : [id number] [Interpreters_FullName] : Samuel [TWNoteComboBox1] : Polish

## 2023-05-04 NOTE — HISTORY OF PRESENT ILLNESS
[FreeTextEntry1] : Razia Otero is a 47 year old female presenting today post ER visit. Had sharp RUQ pain which has since resolved, had imaging showing incidental finding of hepatic steatosis. Pt has not experienced RUQ pain again. Denies chronic GERD symptoms or nausea, vomiting, or dysphagia associated. She thinks pain was MSK in nature. Is also here today for consult for screening colonoscopy. Notes her mother  at a young age from colon cancer. Denies bowel complaints, typically has bowel movements regularly without significant straining or overt bleeding such as melena or hematochezia. Denies unintentional weight loss.

## 2023-05-05 LAB
A1AT SERPL-MCNC: 156 MG/DL
AFP-TM SERPL-MCNC: 1.9 NG/ML
ALBUMIN SERPL ELPH-MCNC: 4.4 G/DL
ANION GAP SERPL CALC-SCNC: 11 MMOL/L
BUN SERPL-MCNC: 7 MG/DL
CALCIUM SERPL-MCNC: 9 MG/DL
CERULOPLASMIN SERPL-MCNC: 32 MG/DL
CHLORIDE SERPL-SCNC: 101 MMOL/L
CO2 SERPL-SCNC: 27 MMOL/L
CREAT SERPL-MCNC: 0.51 MG/DL
EGFR: 115 ML/MIN/1.73M2
GLUCOSE SERPL-MCNC: 111 MG/DL
HAV IGM SER QL: NONREACTIVE
HBV CORE IGM SER QL: NONREACTIVE
HBV SURFACE AG SER QL: NONREACTIVE
HCV AB SER QL: NONREACTIVE
HCV S/CO RATIO: 0.14 S/CO
POTASSIUM SERPL-SCNC: 4.4 MMOL/L
SODIUM SERPL-SCNC: 139 MMOL/L
T4 FREE SERPL-MCNC: 2 NG/DL
TSH SERPL-ACNC: 0.06 UIU/ML

## 2023-05-08 LAB
ANA SER IF-ACNC: NEGATIVE
LKM AB SER QL IF: <20.1 UNITS
MITOCHONDRIA AB SER IF-ACNC: NORMAL

## 2023-05-10 LAB
THYROGLOB AB SERPL-ACNC: <20 IU/ML
THYROGLOB SERPL-MCNC: 0.38 NG/ML

## 2023-05-23 NOTE — ED ADULT TRIAGE NOTE - HEIGHT IN CM
"Chief Complaint  Follow-up of the Left Elbow    Subjective      Malik Abbasi presents to [facility] for follow-up of left radial head fracture sustained in fall in mid February.  Patient waited approximately 6 weeks prior to seeking evaluation.  Imaging revealed a left radial head fracture with intra-articular extension.  He was initially evaluated in office on 3/21/2023, educated on gentle ROM exercises and received referral to outpatient PT.  He has since discontinued PT, reporting that he felt confident in his ability to continue home exercise program.  He presents today for follow-up reporting he has continued pain to the medial elbow.  Reports he has significant stiffness and pain with certain motions, particularly pronation.    Objective   Allergies   Allergen Reactions   • Codeine Other (See Comments)     Syncope       Vital Signs:   Ht 182.9 cm (72\")   Wt (!) 150 kg (331 lb)   BMI 44.89 kg/m²       Physical Exam    Constitutional: Awake, alert. Well nourished appearance.    Integumentary: Warm, dry, intact. No obvious rashes.    HENT: Atraumatic, normocephalic.   Respiratory: Non labored respirations .   Cardiovascular: Intact peripheral pulses.    Psychiatric: Normal mood and affect. A&O X3    Ortho Exam  Left elbow: Skin is warm, dry, and intact.  No tenderness to palpation.  Patient is lacking 10 degrees of extension, near full flexion.  Pain reported with pronation.  Full supination.  Patient is able to form a full fist.  Sensation intact light touch.  Distal neurovascular intact.    Imaging Results (Most Recent)     Procedure Component Value Units Date/Time    XR Elbow 2 View Left [[id number]] Resulted: 05/23/23 1359     Updated: 05/23/23 1400    Narrative:      X-Ray Report:  Study: X-rays ordered, taken in the office, and reviewed today.   Site: Left elbow Xray  Indication: Fracture  View: AP/Lateral view(s)  Findings: Well healed fracture of the left radial " Patient has an up coming lab appointment on 7.16.2020. Per Health Maintenance patient is due for LIPID and I have pended the order. Please review, sign, and place any additional future orders that may be needed.     Patient is requesting the bare minimum of labs due to having to pay out of pocket.    Thank you,   Effie Shelton MLT ([facility])   head, appropriate   alignment.  Prior studies available for comparison: yes                    Assessment and Plan   Problem List Items Addressed This Visit    None  Visit Diagnoses     Left elbow pain    -  Primary    Relevant Orders    XR Elbow 2 View Left (Completed)    Closed nondisplaced fracture of head of left radius with routine healing, subsequent encounter        Relevant Orders    MRI Elbow Left Without Contrast        Follow Up   Return for Recheck.    Tobacco Use: Medium Risk   • Smoking Tobacco Use: Former   • Smokeless Tobacco Use: Never   • Passive Exposure: Not on file     Patient is a former smoker.  Encouraged continued tobacco cessation.  Did not discuss options for smoking cessation.    Patient Instructions   X-RAY taken and reviewed. Patient continues to have significant L elbow pain. Discussed options of PT, HEP, NSAIDs, vs MRI. Patient declines return to PT. He would like to proceed with MRI. Order placed.    Follow up MRI results. Call with changes or concerns.     Patient was given instructions and counseling regarding his condition or for health maintenance advice. Please see specific information pulled into the AVS if appropriate.         152.4

## 2023-05-24 ENCOUNTER — APPOINTMENT (OUTPATIENT)
Dept: GASTROENTEROLOGY | Facility: CLINIC | Age: 48
End: 2023-05-24

## 2023-05-24 VITALS
BODY MASS INDEX: 36.63 KG/M2 | DIASTOLIC BLOOD PRESSURE: 86 MMHG | WEIGHT: 194 LBS | SYSTOLIC BLOOD PRESSURE: 126 MMHG | HEART RATE: 76 BPM | HEIGHT: 61 IN

## 2023-06-06 ENCOUNTER — LABORATORY RESULT (OUTPATIENT)
Age: 48
End: 2023-06-06

## 2023-06-07 LAB
25(OH)D3 SERPL-MCNC: 38.1 NG/ML
ALBUMIN SERPL ELPH-MCNC: 3.9 G/DL
ALP BLD-CCNC: 106 U/L
ALT SERPL-CCNC: 24 U/L
ANION GAP SERPL CALC-SCNC: 10 MMOL/L
AST SERPL-CCNC: 20 U/L
BILIRUB SERPL-MCNC: 0.7 MG/DL
BUN SERPL-MCNC: 8 MG/DL
CALCIUM SERPL-MCNC: 8.3 MG/DL
CHLORIDE SERPL-SCNC: 102 MMOL/L
CO2 SERPL-SCNC: 24 MMOL/L
CREAT SERPL-MCNC: 0.61 MG/DL
EGFR: 110 ML/MIN/1.73M2
ESTIMATED AVERAGE GLUCOSE: 123 MG/DL
GLUCOSE SERPL-MCNC: 120 MG/DL
HBA1C MFR BLD HPLC: 5.9 %
POTASSIUM SERPL-SCNC: 4.4 MMOL/L
PROT SERPL-MCNC: 6.8 G/DL
SODIUM SERPL-SCNC: 137 MMOL/L
T4 FREE SERPL-MCNC: 1.9 NG/DL
THYROGLOB AB SERPL-ACNC: <20 IU/ML
THYROGLOB SERPL-MCNC: <0.2 NG/ML
TSH SERPL-ACNC: 0.08 UIU/ML

## 2023-06-16 ENCOUNTER — APPOINTMENT (OUTPATIENT)
Dept: ENDOCRINOLOGY | Facility: CLINIC | Age: 48
End: 2023-06-16
Payer: MEDICAID

## 2023-06-16 VITALS
HEART RATE: 92 BPM | BODY MASS INDEX: 37.76 KG/M2 | DIASTOLIC BLOOD PRESSURE: 72 MMHG | SYSTOLIC BLOOD PRESSURE: 106 MMHG | OXYGEN SATURATION: 99 % | HEIGHT: 61 IN | WEIGHT: 200 LBS

## 2023-06-16 PROCEDURE — 99215 OFFICE O/P EST HI 40 MIN: CPT

## 2023-06-16 NOTE — REASON FOR VISIT
[Follow - Up] : a follow-up visit [Thyroid Cancer] : thyroid cancer [Source: ______] : History obtained from JOHN [Time Spent: ____ minutes] : Total time spent using  services: [unfilled] minutes. The patient's primary language is not English thus required  services. [Interpreters_IDNumber] : [id number] [Interpreters_FullName] : Kiki [TWNoteComboBox1] : [place]an

## 2023-06-16 NOTE — REVIEW OF SYSTEMS
[Recent Weight Gain (___ Lbs)] : recent weight gain: [unfilled] lbs [As Noted in HPI] : as noted in HPI [Dysphagia] : no dysphagia [Dysphonia] : no dysphonia [Chest Pain] : no chest pain [Palpitations] : no palpitations [Fast Heart Rate] : heart rate is not fast [Shortness Of Breath] : no shortness of breath [Constipation] : no constipation [Diarrhea] : no diarrhea [Polyuria] : no polyuria [Nocturia] : no nocturia [Tremors] : no tremors [Depression] : no depression [Anxiety] : no anxiety [Polydipsia] : no polydipsia [Cold Intolerance] : no cold intolerance [Heat Intolerance] : no heat intolerance [Hot Flashes] : no hot flashes [de-identified] : denies perioral tingling, denies carpal spasms

## 2023-06-16 NOTE — ASSESSMENT
[FreeTextEntry1] : 47 year old female with metastatic Papillary thyroid cancer to cervical lymph nodes without primary tumor in thyroid gland, Tg levels low. pathologist reviewed slides again, no primary tumor in thyroid gland. neck sonogram without abnormal lymph nodes. Thyrogen Stim Tg 7.37 and Thyrogen WBS with 4% uptake in neck s/p BARBA ablation no distant mets on post treatment scane\par - goal TSH is suppression, cont Synthroid 150 mcg daily\par - monitor Tg and Tgab levels\par - repeat WBS in 1 year\par - check ovaries for any masses\par \par posterior neck pain - nothing on exam, lpossible related to Cspine or muscle spasm, advised PCP f/u\par \par PreDM - A1c 5/9%, counseled pt on low carb diet and increase CV exercise\par \par vit D def - start daily OYC vitamin D3 1000 units daily\par \par Hypocalcemia - start OTC daily calcium 600 mg daily\par \par above reviewed with pt and explained results of blood test and scan to patient, will cont to monitor

## 2023-06-16 NOTE — HISTORY OF PRESENT ILLNESS
[FreeTextEntry1] : Pt seen at the request of Dr. Myrick for thyroid cancer.\par - 10/2021 found to have an abnormal Right cervical lymph node on Breast MRI\par - 11/19/2021 sonogram showing small benign appearing partial cystic nodule in the right thyroid lobe. Otherwise, normal thyroid gland. Right cervical enlarged lymph node level 4 measuring 2.4 cm.\par - 4/2022 Right neck level 4 lymph node 2.4 cm FNA biopsy (+) papillary thyroid cancer\par - 5/2022 THyroid sonogram showed no dominant nodule in right thyroid. stable RUP nodule 6 mm and RLP nodule 6x5 mm. Right level 4 neck lymph node.  Left level 1 6 mm rounded lymph node.\par - 6/2022 Left cervical lymph node FNA biopsy: no malignancy, reactive follicular hyperplasia\par \par - 7/18/22 s/p total thyroidectomy and R central neck dissection for met PTCA\par Path: Right neck paratracheal and pretracheal lymph nodes, 4 lymph nodes 3-5 mm: fragments of lymph node tissue with metastatic papillary thyyoid carcinoma.  Thyroid: no papillary thyroid carcinoma identified.  focal thyroid follicular nodular disease, unremarkable parathyroid tissue.\par \par Neck sono 1/3/23 - no abnormal lymph nodes\par \par 2/17/23 Thyrogen WBS: \par FINDINGS: There is radioiodine accumulation in the anterior neck/thyroid bed. There is physiologic distribution of radiopharmaceutical in the remainder of the visualized structures.\par The 48 hour neck I-131 uptake is 4%.\par IMPRESSION: Thyrogen stimulated whole body I-131 scan demonstrates:\par Radioiodine avid tissue in the anterior neck/thyroid bed.\par No evidence of distant functioning metastases.\par 48 hour neck I-131 uptake is 4%.\par Thyrogen stimulated thyroglobulin level on 2/17/2023 was 7.37 ng/mL; thyroglobulin antibody  was < 20 IU/mL.\par \par 4/6/23 BARBA 125 mCi\par \par 4/13/23 post therapy scan: SPECT/CT.  iodine avid tissue in anterior neck/thyroid bed, no evidence of distant functioning mets\par \par Interval hx - 12/9/22 she underwent BTL, fibroid tumor sp hysterectomy bilateral salpingectomy. s/p BARBA therapy 18 days had to go to ER for feeling unwell, testing was okay per pt. complains of posterior neck burnng pain\par \par - current meds: Synthroid 150 mcg daily (adherent with daily dosing at 5 am and administration), vitamin D daily, no OTC calcium

## 2023-06-16 NOTE — PHYSICAL EXAM
[Alert] : alert [EOMI] : extra ocular movement intact [Well Healed Scar] : well healed scar [Clear to Auscultation] : lungs were clear to auscultation bilaterally [Normal S1, S2] : normal S1 and S2 [Normal Rate] : heart rate was normal [No Edema] : no peripheral edema [Not Tender] : non-tender [Soft] : abdomen soft [Normal Gait] : normal gait [Normal Reflexes] : deep tendon reflexes were 2+ and symmetric [No Tremors] : no tremors [Oriented x3] : oriented to person, place, and time [Normal Affect] : the affect was normal [Normal Insight/Judgement] : insight and judgment were intact [Normal Mood] : the mood was normal [No LAD] : no lymphadenopathy [No Respiratory Distress] : no respiratory distress

## 2023-06-20 ENCOUNTER — APPOINTMENT (OUTPATIENT)
Dept: RADIOLOGY | Facility: CLINIC | Age: 48
End: 2023-06-20
Payer: MEDICAID

## 2023-06-20 ENCOUNTER — OUTPATIENT (OUTPATIENT)
Dept: OUTPATIENT SERVICES | Facility: HOSPITAL | Age: 48
LOS: 1 days | End: 2023-06-20
Payer: MEDICAID

## 2023-06-20 DIAGNOSIS — E89.0 POSTPROCEDURAL HYPOTHYROIDISM: Chronic | ICD-10-CM

## 2023-06-20 DIAGNOSIS — Z90.49 ACQUIRED ABSENCE OF OTHER SPECIFIED PARTS OF DIGESTIVE TRACT: Chronic | ICD-10-CM

## 2023-06-20 DIAGNOSIS — Z98.890 OTHER SPECIFIED POSTPROCEDURAL STATES: Chronic | ICD-10-CM

## 2023-06-20 PROCEDURE — 72074 X-RAY EXAM THORAC SPINE4/>VW: CPT

## 2023-06-20 PROCEDURE — 72074 X-RAY EXAM THORAC SPINE4/>VW: CPT | Mod: 26

## 2023-06-22 ENCOUNTER — APPOINTMENT (OUTPATIENT)
Dept: ULTRASOUND IMAGING | Facility: CLINIC | Age: 48
End: 2023-06-22
Payer: MEDICAID

## 2023-06-22 ENCOUNTER — OUTPATIENT (OUTPATIENT)
Dept: OUTPATIENT SERVICES | Facility: HOSPITAL | Age: 48
LOS: 1 days | End: 2023-06-22
Payer: MEDICAID

## 2023-06-22 DIAGNOSIS — E89.0 POSTPROCEDURAL HYPOTHYROIDISM: ICD-10-CM

## 2023-06-22 DIAGNOSIS — Z98.891 HISTORY OF UTERINE SCAR FROM PREVIOUS SURGERY: Chronic | ICD-10-CM

## 2023-06-22 DIAGNOSIS — R73.03 PREDIABETES: ICD-10-CM

## 2023-06-22 DIAGNOSIS — E89.0 POSTPROCEDURAL HYPOTHYROIDISM: Chronic | ICD-10-CM

## 2023-06-22 DIAGNOSIS — Z90.49 ACQUIRED ABSENCE OF OTHER SPECIFIED PARTS OF DIGESTIVE TRACT: Chronic | ICD-10-CM

## 2023-06-22 DIAGNOSIS — Z98.890 OTHER SPECIFIED POSTPROCEDURAL STATES: Chronic | ICD-10-CM

## 2023-06-22 DIAGNOSIS — C73 MALIGNANT NEOPLASM OF THYROID GLAND: ICD-10-CM

## 2023-06-22 PROCEDURE — 76830 TRANSVAGINAL US NON-OB: CPT

## 2023-06-22 PROCEDURE — 76830 TRANSVAGINAL US NON-OB: CPT | Mod: 26

## 2023-06-27 ENCOUNTER — NON-APPOINTMENT (OUTPATIENT)
Age: 48
End: 2023-06-27

## 2023-07-15 NOTE — ED STATDOCS - CLINICAL SUMMARY MEDICAL DECISION MAKING FREE TEXT BOX
Anesthesia Post-op Note    Patient: Stephania Walker  Procedure(s) Performed: LAPAROSCOPY, DIAGNOSTIC, LAPAROSCOPIC APPENDECTOMY  Anesthesia type: General    Vitals Value Taken Time   Temp 36.8 °C (98.3 °F) 07/15/23 1358   Pulse 71 07/15/23 1358   Resp 16 07/15/23 1358   SpO2 98 % 07/15/23 1358   BP 98/60 07/15/23 1358         Post-op Vital Signs:stable  Level of Consciousness: participates in exam  Respiratory Status: spontaneous ventilation  Cardiovascular stable  Hydration: euvolemic  Pain Management: adequately controlled  Vomiting: none  Nausea: None  Airway Patency:patent  Post-op Assessment: awake, alert, appropriately conversant, or baseline, no complications, patient tolerated procedure well and no evidence of recall      No notable events documented.  
44 y/o female presenting with flank pain and urinary symptoms and fever. Check labs, urine, likely Pyelonephritis.

## 2023-09-01 ENCOUNTER — LABORATORY RESULT (OUTPATIENT)
Age: 48
End: 2023-09-01

## 2023-09-07 LAB
ALBUMIN SERPL ELPH-MCNC: 4.2 G/DL
ANION GAP SERPL CALC-SCNC: 12 MMOL/L
BUN SERPL-MCNC: 10 MG/DL
CALCIUM SERPL-MCNC: 8.5 MG/DL
CHLORIDE SERPL-SCNC: 102 MMOL/L
CO2 SERPL-SCNC: 23 MMOL/L
CREAT SERPL-MCNC: 0.6 MG/DL
EGFR: 110 ML/MIN/1.73M2
ESTIMATED AVERAGE GLUCOSE: 131 MG/DL
GLUCOSE SERPL-MCNC: 116 MG/DL
HBA1C MFR BLD HPLC: 6.2 %
POTASSIUM SERPL-SCNC: 4.1 MMOL/L
SODIUM SERPL-SCNC: 137 MMOL/L
T4 FREE SERPL-MCNC: 2.4 NG/DL
THYROGLOB AB SERPL-ACNC: <20 IU/ML
THYROGLOB SERPL-MCNC: <0.2 NG/ML
TSH SERPL-ACNC: 0.05 UIU/ML

## 2023-10-11 ENCOUNTER — APPOINTMENT (OUTPATIENT)
Dept: ENDOCRINOLOGY | Facility: CLINIC | Age: 48
End: 2023-10-11
Payer: MEDICAID

## 2023-10-11 VITALS
BODY MASS INDEX: 37.57 KG/M2 | HEART RATE: 80 BPM | WEIGHT: 199 LBS | DIASTOLIC BLOOD PRESSURE: 82 MMHG | SYSTOLIC BLOOD PRESSURE: 120 MMHG | HEIGHT: 61 IN

## 2023-10-11 DIAGNOSIS — C77.0 SECONDARY AND UNSPECIFIED MALIGNANT NEOPLASM OF LYMPH NODES OF HEAD, FACE AND NECK: ICD-10-CM

## 2023-10-11 DIAGNOSIS — R73.03 PREDIABETES.: ICD-10-CM

## 2023-10-11 DIAGNOSIS — E55.9 VITAMIN D DEFICIENCY, UNSPECIFIED: ICD-10-CM

## 2023-10-11 DIAGNOSIS — E89.0 POSTPROCEDURAL HYPOTHYROIDISM: ICD-10-CM

## 2023-10-11 DIAGNOSIS — E83.51 HYPOCALCEMIA: ICD-10-CM

## 2023-10-11 DIAGNOSIS — C73 MALIGNANT NEOPLASM OF THYROID GLAND: ICD-10-CM

## 2023-10-11 PROCEDURE — 99215 OFFICE O/P EST HI 40 MIN: CPT

## 2023-10-12 ENCOUNTER — APPOINTMENT (OUTPATIENT)
Dept: GASTROENTEROLOGY | Facility: AMBULATORY MEDICAL SERVICES | Age: 48
End: 2023-10-12

## 2023-10-18 ENCOUNTER — APPOINTMENT (OUTPATIENT)
Dept: ULTRASOUND IMAGING | Facility: CLINIC | Age: 48
End: 2023-10-18
Payer: MEDICAID

## 2023-10-18 ENCOUNTER — OUTPATIENT (OUTPATIENT)
Dept: OUTPATIENT SERVICES | Facility: HOSPITAL | Age: 48
LOS: 1 days | End: 2023-10-18
Payer: MEDICAID

## 2023-10-18 DIAGNOSIS — E89.0 POSTPROCEDURAL HYPOTHYROIDISM: ICD-10-CM

## 2023-10-18 DIAGNOSIS — C77.0 SECONDARY AND UNSPECIFIED MALIGNANT NEOPLASM OF LYMPH NODES OF HEAD, FACE AND NECK: ICD-10-CM

## 2023-10-18 DIAGNOSIS — E55.9 VITAMIN D DEFICIENCY, UNSPECIFIED: ICD-10-CM

## 2023-10-18 DIAGNOSIS — R73.03 PREDIABETES: ICD-10-CM

## 2023-10-18 DIAGNOSIS — E89.0 POSTPROCEDURAL HYPOTHYROIDISM: Chronic | ICD-10-CM

## 2023-10-18 DIAGNOSIS — Z98.891 HISTORY OF UTERINE SCAR FROM PREVIOUS SURGERY: Chronic | ICD-10-CM

## 2023-10-18 DIAGNOSIS — Z98.890 OTHER SPECIFIED POSTPROCEDURAL STATES: Chronic | ICD-10-CM

## 2023-10-18 DIAGNOSIS — Z90.49 ACQUIRED ABSENCE OF OTHER SPECIFIED PARTS OF DIGESTIVE TRACT: Chronic | ICD-10-CM

## 2023-10-18 DIAGNOSIS — C73 MALIGNANT NEOPLASM OF THYROID GLAND: ICD-10-CM

## 2023-10-18 PROCEDURE — 76536 US EXAM OF HEAD AND NECK: CPT

## 2023-10-18 PROCEDURE — 76536 US EXAM OF HEAD AND NECK: CPT | Mod: 26

## 2024-01-12 NOTE — ED ADULT TRIAGE NOTE - CHIEF COMPLAINT QUOTE
Satisfactory since 1200 this afternoon, pt has RUQ abdominal pain radiating to right flank associated with dizziness, vomiting and entire body shaking. pt denies fevers or urinary symptoms. pt does not menstruate, hx salpingectomy. pt currently undergoing radiation for thyroid cancer.

## 2024-01-23 ENCOUNTER — APPOINTMENT (OUTPATIENT)
Dept: ENDOCRINOLOGY | Facility: CLINIC | Age: 49
End: 2024-01-23

## 2024-02-15 ENCOUNTER — APPOINTMENT (OUTPATIENT)
Dept: GASTROENTEROLOGY | Facility: HOSPITAL | Age: 49
End: 2024-02-15
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
## 2024-06-28 ENCOUNTER — RX RENEWAL (OUTPATIENT)
Age: 49
End: 2024-06-28

## 2024-09-05 ENCOUNTER — APPOINTMENT (OUTPATIENT)
Dept: ENDOCRINOLOGY | Facility: HOSPITAL | Age: 49
End: 2024-09-05

## 2024-10-04 ENCOUNTER — APPOINTMENT (OUTPATIENT)
Dept: MAMMOGRAPHY | Facility: CLINIC | Age: 49
End: 2024-10-04
Payer: COMMERCIAL

## 2024-10-04 ENCOUNTER — OUTPATIENT (OUTPATIENT)
Dept: OUTPATIENT SERVICES | Facility: HOSPITAL | Age: 49
LOS: 1 days | End: 2024-10-04
Payer: COMMERCIAL

## 2024-10-04 DIAGNOSIS — Z98.891 HISTORY OF UTERINE SCAR FROM PREVIOUS SURGERY: Chronic | ICD-10-CM

## 2024-10-04 DIAGNOSIS — Z90.49 ACQUIRED ABSENCE OF OTHER SPECIFIED PARTS OF DIGESTIVE TRACT: Chronic | ICD-10-CM

## 2024-10-04 DIAGNOSIS — Z98.890 OTHER SPECIFIED POSTPROCEDURAL STATES: Chronic | ICD-10-CM

## 2024-10-04 DIAGNOSIS — E89.0 POSTPROCEDURAL HYPOTHYROIDISM: Chronic | ICD-10-CM

## 2024-10-04 DIAGNOSIS — Z00.8 ENCOUNTER FOR OTHER GENERAL EXAMINATION: ICD-10-CM

## 2024-10-04 PROCEDURE — 77067 SCR MAMMO BI INCL CAD: CPT | Mod: 26

## 2024-10-04 PROCEDURE — 77067 SCR MAMMO BI INCL CAD: CPT

## 2024-10-04 PROCEDURE — 77063 BREAST TOMOSYNTHESIS BI: CPT | Mod: 26

## 2024-10-04 PROCEDURE — 77063 BREAST TOMOSYNTHESIS BI: CPT

## 2024-11-26 DIAGNOSIS — Z12.11 ENCOUNTER FOR SCREENING FOR MALIGNANT NEOPLASM OF COLON: ICD-10-CM

## 2025-01-05 ENCOUNTER — EMERGENCY (EMERGENCY)
Facility: HOSPITAL | Age: 50
LOS: 0 days | Discharge: HOME | End: 2025-01-05
Attending: EMERGENCY MEDICINE
Payer: MEDICAID

## 2025-01-05 VITALS
TEMPERATURE: 99 F | HEART RATE: 81 BPM | RESPIRATION RATE: 18 BRPM | OXYGEN SATURATION: 100 % | SYSTOLIC BLOOD PRESSURE: 113 MMHG | DIASTOLIC BLOOD PRESSURE: 67 MMHG

## 2025-01-05 VITALS — HEIGHT: 62 IN | WEIGHT: 190.26 LBS

## 2025-01-05 DIAGNOSIS — B34.9 VIRAL INFECTION, UNSPECIFIED: ICD-10-CM

## 2025-01-05 DIAGNOSIS — Z98.890 OTHER SPECIFIED POSTPROCEDURAL STATES: Chronic | ICD-10-CM

## 2025-01-05 DIAGNOSIS — Z98.891 HISTORY OF UTERINE SCAR FROM PREVIOUS SURGERY: Chronic | ICD-10-CM

## 2025-01-05 DIAGNOSIS — Z90.49 ACQUIRED ABSENCE OF OTHER SPECIFIED PARTS OF DIGESTIVE TRACT: Chronic | ICD-10-CM

## 2025-01-05 DIAGNOSIS — E89.0 POSTPROCEDURAL HYPOTHYROIDISM: Chronic | ICD-10-CM

## 2025-01-05 DIAGNOSIS — Z86.2 PERSONAL HISTORY OF DISEASES OF THE BLOOD AND BLOOD-FORMING ORGANS AND CERTAIN DISORDERS INVOLVING THE IMMUNE MECHANISM: ICD-10-CM

## 2025-01-05 DIAGNOSIS — R50.9 FEVER, UNSPECIFIED: ICD-10-CM

## 2025-01-05 DIAGNOSIS — D21.9 BENIGN NEOPLASM OF CONNECTIVE AND OTHER SOFT TISSUE, UNSPECIFIED: ICD-10-CM

## 2025-01-05 PROCEDURE — 99282 EMERGENCY DEPT VISIT SF MDM: CPT

## 2025-01-05 PROCEDURE — 99283 EMERGENCY DEPT VISIT LOW MDM: CPT

## 2025-01-05 NOTE — ED ADULT TRIAGE NOTE - CHIEF COMPLAINT QUOTE
Pt presents to ED c/o fever, vomiting and diarrhea since yesterday morning. Pt denies taking medications for her symptoms

## 2025-01-05 NOTE — ED STATDOCS - PROGRESS NOTE DETAILS
GENI Glass: I participated in the care of this patient. I agree with the history, physical and plan.  pt stable for dc with likely viral syndrome and supporative tx, pt agrees with plan.

## 2025-01-05 NOTE — ED STATDOCS - ATTENDING APP SHARED VISIT CONTRIBUTION OF CARE
I,Wiliam Mendenhall MD,  performed the initial face to face bedside interview with this patient regarding history of present illness, review of symptoms and relevant past medical, social and family history.  I completed an independent physical examination.  I was the initial provider who evaluated this patient. I have signed out the follow up of any pending tests (i.e. labs, radiological studies) to the ACP.  I have communicated the patient’s plan of care and disposition with the ACP.  The history, relevant review of systems, past medical and surgical history, medical decision making, and physical examination was documented by the scribe in my presence and I attest to the accuracy of the documentation.

## 2025-01-05 NOTE — ED ADULT TRIAGE NOTE - NS ED TRIAGE AVPU SCALE
Alert-The patient is alert, awake and responds to voice. The patient is oriented to time, place, and person. The triage nurse is able to obtain subjective information.
Superficial inspection, palpation of back & vertebral bodies

## 2025-01-05 NOTE — ED STATDOCS - PATIENT PORTAL LINK FT
You can access the FollowMyHealth Patient Portal offered by [facility] by registering at the following website: [email or web address]. By joining [facility] FollowMyHealth portal, you will also be able to view your health information using other applications (apps) compatible with our system.

## 2025-01-05 NOTE — ED STATDOCS - OBJECTIVE STATEMENT
49 year old female with PMHx of anemia, fibroids, s/p thyroidectomy; presents to ED c/o flu-like symptoms. Patient reports fever, nausea, vomiting, diarrhea, abdominal pain, weakness, HA and body aches since Friday night and states she felt worse on yesterday, No diarrhea today, last vomited x 6 hours ago. Patient states her kids were sick with something similar last week. Patient has not taken any medications for symptoms.    Patient is Persian-speaking,  was used  #[id number]

## 2025-01-05 NOTE — ED STATDOCS - ENMT, MLM
Nasal mucosa clear.  Mouth with normal mucosa. Neck non-tender supple without pain. Throat has no vesicles, no oropharyngeal exudates and uvula is midline.

## 2025-01-05 NOTE — ED ADULT NURSE NOTE - OBJECTIVE STATEMENT
Pt is a 50 y/o female who presents to the ED with c/o flu like symptoms. fever, vomiting and diarrhea since yesterday morning. Pt denies taking medications for her symptoms

## 2025-01-05 NOTE — ED ADULT NURSE NOTE - NSFALLUNIVINTERV_ED_ALL_ED
Bed/Stretcher in lowest position, wheels locked, appropriate side rails in place/Call bell, personal items and telephone in reach/Instruct patient to call for assistance before getting out of bed/chair/stretcher/Non-slip footwear applied when patient is off stretcher/[place] to call system/Physically safe environment - no spills, clutter or unnecessary equipment/Purposeful proactive rounding/Room/bathroom lighting operational, light cord in reach

## 2025-01-05 NOTE — ED STATDOCS - NSFOLLOWUPINSTRUCTIONS_ED_ALL_ED_FT
Josefina un seguimiento con banegas pmd dentro de las 48 horas   Patricia muchos líquidos   [place] motrin 600 mg cada 6 horas según sea necesario para el dolor/fiebre (sin receta)   [place] tylenol 650 mg cada 4 a 6 horas según sea necesario para el dolor/fiebre (sin receta)   Regrese al servicio de urgencias si los síntomas empeoran.    Enfermedades virales en los niños  Viral Illness, Pediatric  Los virus son microbios diminutos que entran en el organismo de tosha persona y causan enfermedades. Hay muchos tipos diferentes de virus. Y causan muchos tipos de enfermedades. Las enfermedades virales son muy frecuentes en los niños. La mayoría de las enfermedades virales que afectan a los niños no son graves. Rach todas desaparecen sin tratamiento después de algunos días.    En los niños, las afecciones a corto plazo más frecuentes causadas por un virus incluyen:  Virus del resfrío y la gripe.  Virus estomacales.  Virus que causan fiebre y erupciones cutáneas. Estos incluyen enfermedades lindsay el sarampión, la rubéola, la roséola, la quinta enfermedad y la varicela.  Las afecciones a ebony plazo causadas por un virus incluyen el herpes, la poliomielitis y la infección por el virus de inmunodeficiencia humana (VIH). Se romo identificado unos pocos virus asociados con determinados tipos de cáncer.    ¿Cuáles son las causas?  Muchos tipos de virus pueden causar enfermedades. Los diferentes virus ingresan al organismo de distintas formas. El yi puede contraer un virus de la siguiente forma:  Al inhalar gotitas que tosha persona infectada liberó en el aire al toser o estornudar. Los virus del resfrío y de la gripe, así lindsay aquellos que causan fiebre y erupciones cutáneas, suelen diseminarse a través de estas gotitas.  Al tocar cualquier cosa que esté contaminada con el virus y luego llevarse la mano a la boca, la nariz o los ojos. Los objetos pueden tener el virus encima si:  Les caen las gotitas que tosha persona infectada liberó al toser o estornudar.  Tuvieron contacto con el vómito o la materia fecal (heces) de tosha persona infectada. Los virus estomacales pueden diseminarse a través del vómito o de la materia fecal.  Al consumir un alimento o tosha bebida que hayan estado en contacto con el virus.  Al ser pineda por un insecto o mordido por un animal que son portadores del virus.  Al tener contacto con rachna o líquidos que contienen el virus, ya sea a través de un dwaine abierto o bushra tosha transfusión.  Si el virus ingresa al organismo del yi, el sistema de banegas cuerpo que combate las enfermedades (sistema inmunitario) intentará combatirlo. El yi puede correr un riesgo más alto de tener tosha enfermedad viral si tiene el sistema inmunitario debilitado.    ¿Cuáles son los signos o síntomas?  Los síntomas dependen del tipo de virus y de la ubicación de las células en las que ingresa. Entre los síntomas se pueden incluir los siguientes:  Con los virus del resfrío y de la gripe:  Fiebre.  Dolor de garganta.  Giselle musculares y de dolor de gómez.  Nariz tapada (congestión nasal).  Dolor de oídos.  Tos.  Con los virus estomacales (gastrointestinales):  Fiebre.  Pérdida del apetito.  Náuseas y vómitos.  Dolor en el abdomen.  Diarrea.  Con los virus que causan fiebre y erupciones cutáneas:  Fiebre.  Glándulas inflamadas.  Erupción cutánea.  Secreción nasal.  ¿Cómo se diagnostica?  Esta afección se puede diagnosticar en función de tosha o más de las siguientes evaluaciones:  Los síntomas y antecedentes médicos del yi.  Un examen físico.  Pruebas, lindsay, por ejemplo:  Análisis de rachna.  Análisis de tosha muestra de mucosidad de los pulmones (muestra de esputo).  Análisis de un hisopado de líquidos corporales o tosha llaga en la piel (lesión).  ¿Cómo se trata?  La mayoría de las enfermedades virales en los niños desaparecen en el término de 3 a 10 días. En la mayoría de los casos, no se necesita tratamiento. El pediatra puede sugerir que se administren medicamentos de venta chitra para tratar los síntomas.    Tosha enfermedad viral no se puede tratar con antibióticos. Los virus viven adentro de las células, y los antibióticos no pueden penetrar en ellas. En cambio, a veces se usan los antivirales para tratar las enfermedades virales, edwin karel vez es necesario administrarles estos medicamentos a los niños.    Muchas enfermedades virales de la niñez pueden prevenirse con vacunas (inmunización). Estas vacunas ayudan a prevenir la gripe y muchos de los virus que causan fiebre y erupciones cutáneas.    Siga estas indicaciones en banegas casa:  Medicamentos    Adminístrele al yi los medicamentos de venta chitra y los recetados solamente lindsay se lo haya indicado el pediatra.  Generalmente, no es necesario administrar medicamentos para el resfrío y la gripe.  Si el yi tiene fiebre, pregúntele al médico qué medicamento de venta chitra administrarle y en qué cantidad o dosis.  No le administre aspirina al yi porque se asocia con el síndrome de [place].  Si el yi es mayor de 4 años y tiene tos o dolor de garganta, pregúntele al médico si puede darle gotas para la tos o pastillas para la garganta.  No solicite tosha receta de antibióticos si al yi le diagnosticaron tosha enfermedad viral. Los antibióticos no harán que la enfermedad del yi desaparezca más rápidamente. Además, kit antibióticos cuando no son necesarios puede derivar en resistencia a los antibióticos. Cuando esto ocurre, el medicamento pierde banegas eficacia contra las bacterias que normalmente combate.  Si al yi le recetaron un medicamento antiviral, adminístreselo lindsay se lo haya indicado el pediatra. No deje de darle el antiviral al yi aunque comience a sentirse mejor.  Comida y bebida    Si el yi tiene vómitos, cas solamente sorbos de líquidos kelsey. Ofrézcale sorbos de líquido con frecuencia. Siga las instrucciones del pediatra acerca de lo que el yi puede comer y beber.  Si el yi puede beber líquidos, josefina que tome la cantidad suficiente para mantener el pis (la orina) de color amarillo pálido.  Indicaciones generales    Asegúrese de que el yi descanse lo suficiente.  Si el yi tiene congestión nasal, pregúntele al pediatra si puede ponerle gotas o un aerosol de solución salina en la nariz.  Si el yi tiene tos, coloque en banegas habitación un humidificador de vapor frío.  Josefina que el yi se quede en casa hasta que los síntomas hayan desaparecido. El yi debe retomar cuong actividades normales lindsay se lo haya indicado el pediatra. Consulte al pediatra qué actividades son seguras para el yi.  ¿Cómo se previene?  A person washing hands with soap and water.  Para reducir el riesgo de que el yi contraiga otra enfermedad viral:  Enséñele al yi a lavarse frecuentemente las jerrell con agua y jabón bushra al menos 20 segundos. Use desinfectante para jerrell si no dispone de agua y jabón.  Enséñele al yi a que no se toque la nariz, los ojos y la boca, especialmente si no se ha lavado las jerrell recientemente.  Si un miembro de la evie tiene tosha infección viral, limpie todas las superficies de la casa que puedan alicia estado en contacto con el virus. Use [place] y jabón. También puede usar tosha solución de preparación comercial que contenga lejía.  Mantenga al yi alejado de las personas enfermas con síntomas de tosha infección viral.  Enséñele al yi a no compartir objetos, lindsay cepillos de dientes y botellas de agua, con otras personas.  Mantenga al día todas las vacunas del yi.  Josefina que el yi coma tosha dieta cb y descanse mucho.  Comuníquese con un médico si:  El yi tiene síntomas de tosha enfermedad viral bushra más tiempo de lo esperado. Pregúntele al pediatra cuánto tiempo deberían durar los síntomas.  El tratamiento en la casa no controla los síntomas del yi o estos están empeorando.  El yi tiene vómitos que martinez más de 24 horas.  Solicite ayuda de inmediato si:  El yi es [place] de 3 meses y tiene fiebre de 100.4 °F (38 °C) o más.  El yi tiene de 3 meses a 3 años de edad y presenta fiebre de 102.2 °F (39 °C) o más.  El yi tiene problemas para respirar.  El yi tiene dolor de gómez intenso o rigidez en el kanchan.  Estos síntomas pueden indicar tosha emergencia. No espere a vitaliy si los síntomas desaparecen. Solicite ayuda de inmediato. Llame al 911.    Esta información no tiene lindsay fin reemplazar el consejo del médico. Asegúrese de hacerle al médico cualquier pregunta que tenga. Khadra un seguimiento con banegas pmd dentro de las 48 horas   Patricia muchos líquidos   [place] motrin 600 mg cada 6 horas según sea necesario para el dolor/fiebre (sin receta)   [place] tylenol 650 mg cada 4 a 6 horas según sea necesario para el dolor/fiebre (sin receta)   Regrese al servicio de urgencias si los síntomas empeoran.    Enfermedades virales en los adultos  Viral Illness, Adult  Los virus son microbios diminutos que entran en el organismo de tosha persona y causan enfermedades. Hay muchos tipos diferentes de virus. Y causan muchos tipos de enfermedades. Las enfermedades virales pueden ser leves o graves. Pueden afectar diferentes partes del cuerpo.    Entre las afecciones a corto plazo causadas por virus, se incluyen los resfríos, la gripe y los virus estomacales. Entre las afecciones a ebony plazo causadas por un virus, se incluyen el herpes, la culebrilla y la infección por el virus de inmunodeficiencia humana (VIH). Se romo identificado unos pocos virus asociados con determinados tipos de cáncer.    ¿Cuáles son las causas?  Muchos tipos de virus pueden causar enfermedades. Los virus ingresan en las células del organismo, se multiplican y provocan que las células infectadas funcionen de manera diferente o mueran. Cuando estas células mueren, liberan más virus. Cuando esto ocurre, aparecen los síntomas de la enfermedad, y el virus se disemina a otras células. Si el virus domina el funcionamiento de la célula, puede hacer que esta se divida y prolifere de manera descontrolada. Orrick ocurre cuando un virus causa cáncer.    Los diferentes virus ingresan al organismo de distintas formas. Puede contraer un virus de la siguiente manera:  Al ingerir alimentos o beber agua que romo entrado en contacto con el virus.  Al inhalar gotitas que tosha persona infectada liberó en el aire al toser o estornudar.  Al tocar tosha superficie que tenga el virus y luego llevarse la mano a la boca, la nariz o los ojos.  Al ser pineda por un insecto o mordido por un animal que son portadores del virus.  Al tener contacto sexual con tosha persona infectada por el virus.  Al tener contacto con rachna o líquidos que contienen el virus, ya sea a través de un dwaine abierto o bushra tosha transfusión.  Si el virus ingresa al organismo, el sistema del cuerpo que combate las enfermedades (sistema inmunitario) intentará combatirlo. Puede correr un riesgo más alto de tener tosha enfermedad viral si tiene el sistema inmunitario debilitado.    ¿Cuáles son los signos o síntomas?  Los síntomas dependen del tipo de virus y de la ubicación de las células en las que ingresa. Entre los síntomas se pueden incluir los siguientes:  Con los virus del resfrío y de la gripe:  Fiebre.  Dolor de gómez.  Dolor de garganta.  Giselle musculares.  Nariz tapada (congestión nasal).  Tos.  Con los virus estomacales (gastrointestinales):  Fiebre.  Dolor en el abdomen.  Náuseas o vómitos.  Diarrea.  Con los virus hepáticos (hepatitis):  Pérdida del apetito.  Cansancio.  La piel o las partes altaf de los ojos se ponen cassie (ictericia).  Con los virus del cerebro y la médula xiong:  Fiebre.  Dolor de gómez.  Rigidez en el kanchan.  Náuseas y vómitos.  Confusión o somnolencia.  Con los virus de la piel:  Verrugas.  Picazón.  Erupción cutánea.  Con los virus de transmisión sexual:  Secreción.  Hinchazón.  Enrojecimiento.  Erupción cutánea.  ¿Cómo se diagnostica?  Esta afección se puede diagnosticar en función de tosha o más de las siguientes evaluaciones:  Los síntomas y antecedentes médicos.  Un examen físico.  Pruebas, lindsay, por ejemplo:  Análisis de rachna.  Análisis de tosha muestra de mucosidad de los pulmones (muestra de esputo).  Análisis de tosha muestra de materia fecal (heces).  Análisis de un hisopado de líquidos corporales o tosha llaga en la piel (lesión).  ¿Cómo se trata?  Los virus pueden ser difíciles de tratar porque se hospedan en las células. Los antibióticos no tratan los virus porque estos medicamentos no llegan al interior de las células. El tratamiento de tosha enfermedad viral puede incluir lo siguiente:  Descansar y beber mucho líquido.  Medicamentos para tratar los síntomas. Estos pueden incluir medicamentos de venta chitra para el dolor y la fiebre, medicamentos para la tos o la congestión y medicamentos para la diarrea.  Medicamentos antivirales. Estos medicamentos están disponibles únicamente para ciertos tipos de virus.  Algunas enfermedades virales pueden evitarse con vacunas. Un ejemplo frecuente es la vacuna antigripal.    Siga estas indicaciones en banegas casa:  Medicamentos    Use los medicamentos de venta chitra y los recetados solamente lindsay se lo haya indicado el médico.  Si le recetaron un medicamento antiviral, tómelo lindsay se lo haya indicado el médico. No deje de kit el antiviral aunque comience a sentirse mejor.  Sepa cuándo los antibióticos son necesarios y cuándo no. Los antibióticos no combaten a los virus. Si tiene tosha infección viral y el médico brendon que también tiene tosha infección bacteriana, o que está en riesgo de contraerla, lolly vez le recete un antibiótico.  No solicite tosha receta para antibióticos si le romo diagnosticado tosha enfermedad viral. Los antibióticos no harán que se cure más rápidamente.  Kit antibióticos cuando no son necesarios puede derivar en resistencia a los antibióticos. Cuando esto ocurre, el medicamento pierde banegas eficacia contra las bacterias que normalmente combate.  Indicaciones generales    Patricia suficiente líquido para mantener el pis (la orina) de color amarillo pálido.  Descanse todo lo que pueda.  Retome fátima actividades normales lindsay se lo haya indicado el médico. Pregúntele al médico qué actividades son seguras para usted.  ¿Cómo se previene?  A person washing hands with soap and water.  Para reducir el riesgo de contraer otra enfermedad viral:  Lávese las jerrell frecuentemente con agua y jabón bushra al menos 20 segundos. Use desinfectante para jerrell si no dispone de agua y jabón.  Evite tocarse la nariz, los ojos y la boca, sobre todo si no se lavó las jerrell recientemente.  Si un miembro de la evie tiene tosha infección viral, limpie todas las superficies de la casa que puedan alicia estado en contacto con el virus. Use [place] y jabón. También puede usar tosha solución de preparación comercial que contenga lejía.  Manténgase lejos de las personas enfermas con síntomas de tosha infección viral.  No comparta objetos tales lindsay cepillos de dientes y botellas de agua con otras personas.  Mantenga las vacunas al día. [place]rick incluye recibir la vacuna antigripal todos los años.  Siga tosha dieta saludable y descanse lo suficiente.  Comuníquese con un médico si:  Tiene síntomas de tosha enfermedad viral que no desaparecen.  Los síntomas regresan después de alicia desaparecido.  Fátima síntomas empeoran.  Solicite ayuda de inmediato si:  Tiene dificultad para respirar.  Siente un dolor de gómez intenso o rigidez en el kanchan.  Tiene vómitos o dolor abdominal intensos.  Estos síntomas pueden indicar tosha emergencia. Solicite ayuda de inmediato. Llame al 911.  No espere a vitaliy si los síntomas desaparecen.  No conduzca por fátima propios medios hasta el hospital.  Esta información no tiene lindsay fin reemplazar el consejo del médico. Asegúrese de hacerle al médico cualquier pregunta que tenga.    Document Revised: 01/26/2024 Document Reviewed: 10/18/2023  [employer] Patient Education © 2024 [employer]  [employer] logo  Terms and Conditions  Privacy Policy  Editorial Policy  All content on this site: Copyright © 2025 Elsevier, its licensors, and contributors. All rights are reserved, including those for text and data mining, AI training, and similar technologies. For all open access content, the Creative Commons licensing terms apply.  Cookies are used by this site. To decline or learn more, visit our Cookies page.

## 2025-01-05 NOTE — ED STATDOCS - CLINICAL SUMMARY MEDICAL DECISION MAKING FREE TEXT BOX
Pt with likely viral syndrome. Pt well-appearing, afebrile. VSS. Kids with similar sx. Patient not nauseous, no vomiting or diarrhea. Will d/c home, recommend followup with PCP

## 2025-01-24 NOTE — ED PROVIDER NOTE - CROS ED CONS ALL NEG
James R Lachman, M.D.  Attending, Orthopaedic Surgery  Foot and Ankle  [facility]        ORTHOPAEDIC FOOT AND ANKLE CLINIC VISIT     Assessment:     Encounter Diagnoses   Name Primary?    Sprain of anterior talofibular ligament of right ankle, sequela Yes    Chronic pain of right ankle     Ankle swelling, right           Plan:   The patient verbalized understanding of exam findings and treatment plan. We engaged in the shared decision-making process and treatment options were discussed at length with the patient. Surgical and conservative management discussed today along with risks and benefits.  Patient sustained sprain of ATFL and CFL of right ankle 3 months ago playing basketball.   She has been working with trainers at school and getting her ankle taped for practices and games and she has returned to playing without issue. She has not done formal PT  She was seen by Johnny Otoole PA-C 1 month ago and MRI was ordered. Presents today for review of MRI  MRI right ankle reviewed and demonstrates sprain of ATFL and CFL; no surgical findings on MRI, peroneal tendons intact   She reports improvement of symptoms working with trainers and notes only occasional minimal discomfort.  Begin formal PT  Continue working with athletic trainers at school. May continue ankle taping  OTC pain medication as needed, rest, elevation, compression stocking for pain and swelling control   Return in about 7 weeks (around 3/14/2025).      History of Present Illness:   Chief Complaint:   Chief Complaint   Patient presents with    Right Ankle - Pain     Sprained it a few months ago and went for an MRI.    Patient presents with her father today.    Darya Pal is a 17 y.o. female who is being seen for right ankle sprain. Sprained her ankle 3 months ago playing basketball from inversion injury. She has been working with trainers at school getting ankle taped. Notes her pain has improvement, only has occasional  discomfort but has returned to playing basketball.  Pain is localized at lateral ankle with minimal radiating and described as sharp and severe. Patient denies numbness, tingling or radicular pain.  Denies history of neuropathy.  Patient does not smoke, does not have diabetes and does not take blood thinners.  Patient denies family history of anesthesia complications and has not had any complications with anesthesia.     Pain/symptom timing:  Worse during the day when active  Pain/symptom context:  Worse with activites and work  Pain/symptom modifying factors:  Rest makes better, activities make worse  Pain/symptom associated signs/symptoms: none    Prior treatment   NSAIDsYes    Injections No   Bracing/Orthotics No   Physical Therapy No     Orthopedic Surgical History:   See below     Past Medical, Surgical and Social History:  Past Medical History:  has no past medical history on file.  Problem List: does not have any pertinent problems on file.  Past Surgical History:  has no past surgical history on file.  Family History: family history is not on file.  Social History:  reports that she has never smoked. She has never used smokeless tobacco. She reports that she does not drink alcohol and does not use drugs.  Current Medications: has a current medication list which includes the following prescription(s): ciprofloxacin-dexamethasone, clindamycin, and prednisone.  Allergies: is allergic to amoxicillin.     Review of Systems:  General- denies fever/chills  HEENT- denies hearing loss or sore throat  Eyes- denies eye pain or visual disturbances, denies red eyes  Respiratory- denies cough or SOB  Cardio- denies chest pain or palpitations  GI- denies abdominal pain  Endocrine- denies urinary frequency  Urinary- denies pain with urination  Musculoskeletal- Negative except noted above  Skin- denies rashes or wounds  Neurological- denies dizziness or headache  Psychiatric- denies anxiety or difficulty  "concentrating    Physical Exam:   Ht 6' 1\" (1.854 m)   Wt 86.2 kg (190 lb)   BMI 25.07 kg/m²   General/Constitutional: No apparent distress: well-nourished and well developed.  Eyes: normal ocular motion  Cardio: RRR, Normal S1S2, No m/r/g  Lymphatic: No appreciable lymphadenopathy  Respiratory: Non-labored breathing, CTA b/l no w/c/r  Vascular: No edema, swelling or tenderness, except as noted in detailed exam.  Integumentary: No impressive skin lesions present, except as noted in detailed exam.  Neuro: No ataxia or tremors noted  Psych: Normal mood and affect, oriented to person, place and time. Appropriate affect.  Musculoskeletal: Normal, except as noted in detailed exam and in HPI.    Examination    Right    Gait Normal   Musculoskeletal No ttp     Skin Normal.  Mild swelling lateral ankle    Nails Normal    Range of Motion  20 degrees dorsiflexion, 30 degrees plantarflexion  Subtalar motion: normal    Stability Stable    Muscle Strength 5/5 tibialis anterior  5/5 gastrocnemius-soleus  5/5 posterior tibialis  5/5 peroneal/eversion strength  5/5 EHL  5/5 FHL    Neurologic Normal    Sensation  Intact to light touch throughout sural, saphenous, superficial peroneal, deep peroneal and medial/lateral plantar nerve distributions.  [place] 5.07 filament (10g) testing  deferred.    Cardiovascular Brisk capillary refill < 2 seconds,intact DP and PT pulses    Special Tests Negative anterior drawer       Imaging Studies:   MRI right ankle available for review and demonstrates sprains of ATFL and CFL.    I personally performed the service.      Nneka Kang PA-C  " No negative...

## 2025-01-28 ENCOUNTER — NON-APPOINTMENT (OUTPATIENT)
Age: 50
End: 2025-01-28

## 2025-01-28 ENCOUNTER — APPOINTMENT (OUTPATIENT)
Dept: CARDIOLOGY | Facility: HOSPITAL | Age: 50
End: 2025-01-28

## 2025-01-28 ENCOUNTER — OUTPATIENT (OUTPATIENT)
Dept: OUTPATIENT SERVICES | Facility: HOSPITAL | Age: 50
LOS: 1 days | End: 2025-01-28
Payer: SELF-PAY

## 2025-01-28 VITALS
DIASTOLIC BLOOD PRESSURE: 76 MMHG | HEART RATE: 70 BPM | BODY MASS INDEX: 35.52 KG/M2 | SYSTOLIC BLOOD PRESSURE: 112 MMHG | WEIGHT: 188 LBS | OXYGEN SATURATION: 99 %

## 2025-01-28 DIAGNOSIS — E89.0 POSTPROCEDURAL HYPOTHYROIDISM: Chronic | ICD-10-CM

## 2025-01-28 DIAGNOSIS — R73.03 PREDIABETES.: ICD-10-CM

## 2025-01-28 DIAGNOSIS — I25.10 ATHEROSCLEROTIC HEART DISEASE OF NATIVE CORONARY ARTERY WITHOUT ANGINA PECTORIS: ICD-10-CM

## 2025-01-28 DIAGNOSIS — E66.9 OBESITY, UNSPECIFIED: ICD-10-CM

## 2025-01-28 PROCEDURE — 93005 ELECTROCARDIOGRAM TRACING: CPT

## 2025-01-28 PROCEDURE — G0463: CPT

## 2025-01-29 PROBLEM — E66.9 OBESITY (BMI 30-39.9): Status: ACTIVE | Noted: 2025-01-29

## 2025-01-29 LAB
CHOLEST SERPL-MCNC: 183 MG/DL
ESTIMATED AVERAGE GLUCOSE: 131 MG/DL
HBA1C MFR BLD HPLC: 6.2 %
HDLC SERPL-MCNC: 44 MG/DL
LDLC SERPL CALC-MCNC: 112 MG/DL
NONHDLC SERPL-MCNC: 139 MG/DL
TRIGL SERPL-MCNC: 150 MG/DL

## 2025-01-31 DIAGNOSIS — R73.03 PREDIABETES: ICD-10-CM

## 2025-01-31 DIAGNOSIS — E66.9 OBESITY, UNSPECIFIED: ICD-10-CM

## 2025-03-31 ENCOUNTER — EMERGENCY (EMERGENCY)
Facility: HOSPITAL | Age: 50
LOS: 0 days | Discharge: HOME | End: 2025-03-31
Attending: EMERGENCY MEDICINE
Payer: MEDICAID

## 2025-03-31 VITALS
TEMPERATURE: 99 F | DIASTOLIC BLOOD PRESSURE: 78 MMHG | RESPIRATION RATE: 18 BRPM | SYSTOLIC BLOOD PRESSURE: 122 MMHG | HEART RATE: 83 BPM | OXYGEN SATURATION: 99 % | WEIGHT: 194.67 LBS

## 2025-03-31 DIAGNOSIS — E89.0 POSTPROCEDURAL HYPOTHYROIDISM: Chronic | ICD-10-CM

## 2025-03-31 DIAGNOSIS — L50.9 URTICARIA, UNSPECIFIED: ICD-10-CM

## 2025-03-31 DIAGNOSIS — D21.9 BENIGN NEOPLASM OF CONNECTIVE AND OTHER SOFT TISSUE, UNSPECIFIED: ICD-10-CM

## 2025-03-31 DIAGNOSIS — Z98.890 OTHER SPECIFIED POSTPROCEDURAL STATES: Chronic | ICD-10-CM

## 2025-03-31 DIAGNOSIS — Z98.891 HISTORY OF UTERINE SCAR FROM PREVIOUS SURGERY: Chronic | ICD-10-CM

## 2025-03-31 DIAGNOSIS — Z90.49 ACQUIRED ABSENCE OF OTHER SPECIFIED PARTS OF DIGESTIVE TRACT: Chronic | ICD-10-CM

## 2025-03-31 PROCEDURE — 99283 EMERGENCY DEPT VISIT LOW MDM: CPT

## 2025-03-31 RX ORDER — PREDNISONE 20 MG/1
50 TABLET ORAL ONCE
Refills: 0 | Status: COMPLETED | OUTPATIENT
Start: 2025-03-31 | End: 2025-03-31

## 2025-03-31 RX ORDER — PREDNISONE 20 MG/1
1 TABLET ORAL
Qty: 5 | Refills: 0
Start: 2025-03-31 | End: 2025-04-04

## 2025-03-31 RX ADMIN — PREDNISONE 50 MILLIGRAM(S): 20 TABLET ORAL at 23:41

## 2025-03-31 RX ADMIN — Medication 10 MILLIGRAM(S): at 23:42

## 2025-03-31 NOTE — ED STATDOCS - CLINICAL SUMMARY MEDICAL DECISION MAKING FREE TEXT BOX
Vital signs within normal limits.  Patient has mild generalized hives with excoriation.  She has no vesicles, no bullae, no skin sloughing, or other features of emergency rash.  She has no oral lesions, no oral swelling, no wheezing.  No signs of anaphylaxis.  She has no fever to suggest viral entity.  Diagnosis of hives, likely allergic reaction.  Given generalized nature will give patient cetirizine, and prednisone department.  Will send prednisone to pharmacy.  No known triggers.  Recommend avoiding any potential triggers.  Recommend close outpatient follow-up PCP, allergy.  Strict return precautions given for any worsening.  Patient verbalized understanding and agreed to plan.

## 2025-03-31 NOTE — ED STATDOCS - PHYSICAL EXAMINATION
Patient is awake, alert, orient x 3  Heart sounds within normal limits, regular rhythm  Lungs are clear bilaterally, no respiratory distress  Abdomen soft, nontender  Extremities well-perfused, no edema  Mild generalized hives, no oral swelling or lesions, no vesicles, no bulla, no skin sloughing

## 2025-03-31 NOTE — ED STATDOCS - NSFOLLOWUPINSTRUCTIONS_ED_ALL_ED_FT
Ronchas  Hives  Las ronchas (urticaria) son zonas enrojecidas e hinchadas en la piel que ocasionan picazón. Pueden aparecer en cualquier parte del cuerpo. Las ronchas suelen mejorar en el transcurso de 24 horas (ronchas agudas). Si tiene ronchas nuevas después de que las [place] desaparecen y el ciclo dura muchos días o semanas, se denominan ronchas crónicas. No se transmiten de persona a persona (no son contagiosas).    Las ronchas pueden aparecer cuando el cuerpo reacciona a algo a lo que usted es alérgico (alérgeno) o a algo que irrita la piel. Cuando se expone a algo que desencadena ronchas, el cuerpo libera tosha sustancia química llamada histamina. Esta sustancia ocasiona enrojecimiento, picazón e hinchazón. Las ronchas pueden aparecer inmediatamente después de que usted haya estado expuesto a un factor desencadenante u horas más tarde.    ¿Cuáles son las causas?  Las ronchas pueden ser causadas por lo siguiente:  Alergias a los alimentos.  Picaduras o mordeduras de insectos.  Alergias al polen o a las mascotas.  Exposición a la luz maria del sol, al calor o al frío .  Actividad física.  Estrés.  Puede tener ronchas debido a otras afecciones y tratamientos. Estos incluyen:  Virus, lindsay el resfrío común.  Infecciones bacterianas, lindsay infecciones de las vías urinarias y faringitis estreptocócica.  Ciertos medicamentos.  Contacto con el látex o con sustancias químicas.  Vacunas contra la alergia.  Transfusiones de rachna.  En algunos casos, se desconocen los factores que causan las ronchas (ronchas idiopáticas).    ¿Qué incrementa el riesgo?  Es más propenso a tener ronchas si:  Es oksana.  Tiene alergias alimentarias. Las ronchas son más frecuentes si usted es alérgico a los cítricos, la leche, los huevos, los maníes, los alec secos o los mariscos.  Es alérgico a:  Medicamentos.  Látex.  Insectos.  Animales.  Polen.  ¿Cuáles son los signos o síntomas?  A red rash on a person's upper arm.  Los síntomas frecuentes de las ronchas incluyen la presencia de zonas o protuberancias elevadas de color alejandro o ramesh en la piel que ocasionan picazón. Estas zonas pueden:  Convertirse en ronchas grandes e inflamadas (habones).  Cambiar rápidamente de forma y la ubicación. [place] puede suceder más de tosha vez.  Aparecer en forma de ronchas separadas o conectarse y abarcar tosha frdedy extensa de la piel.  Causar escozor o volverse dolorosas.  Volverse altaf (palidecer) al presionar en el centro.  En casos graves, las jerrell, los pies y el otoniel también pueden hincharse. [place] puede ocurrir si las ronchas se kierra en las capas profundas de la piel.    ¿Cómo se diagnostica?  Las ronchas se pueden diagnosticar en función de los síntomas, los antecedentes médicos y un examen físico.  Pueden realizarle pruebas cutáneas o análisis de pis (orina) o rachna. Estos estudios pueden ayudar a averiguar qué causa las ronchas y descartar otros problemas de jeremias.  También pueden hacerle tosha biopsia. En esta prueba, se extrae un pequeño trozo de piel para analizarlo.  ¿Cómo se trata?  El tratamiento de las ronchas depende de la causa y de la intensidad de los síntomas. Es posible que le indiquen que use paños fríos y húmedos (compresas frías) o que tome duchas frías para aliviar la picazón. El tratamiento también puede incluir lo siguiente:  Medicamentos para ayudar a:  Aliviar la picazón (antihistamínicos).  Reducir la hinchazón (corticoesteroides).  Tratar la infección (antibióticos).  Un medicamento inyectable llamado omalizumab. Es posible que necesite esto si tiene ronchas idiopáticas crónicas y continúa con síntomas aun después del tratamiento con antihistamínicos.  En los casos graves, es posible que deba usar un dispositivo lleno de medicamento que administra tosha inyección de emergencia de epinefrina (lápiz autoinyector) para prevenir tosha reacción alérgica muy grave (reacción anafiláctica).    Siga estas indicaciones en banegas casa:  Medicamentos    Use y aplíquese los medicamentos de venta chitra y los recetados solamente lindsay se lo haya indicado el médico.  Si le recetaron antibióticos, tómelos lindsay se lo haya indicado el médico. No deje de usar el antibiótico aunque comience a sentirse mejor.  Cuidado de la piel    Aplique compresas frías en las zonas afectadas.  No se rasque ni se frote la piel.  Indicaciones generales    No se duche ni tome praful de inmersión con [place]. Podría empeorar la picazón.  No use ropa ajustada.  Use pantalla solar. Use ropa protectora cuando esté al aire chitra.  Evite todo lo que le cause ronchas. Lleve un registro para realizar un seguimiento de aquello que le causa ronchas. Escriba los siguientes datos:  Los medicamentos que jessica.  Lo que usted come y ariana.  Los productos que usa en la piel.  Concurra a todas las visitas de seguimiento. El médico hará un seguimiento del funcionamiento del tratamiento.  Comuníquese con un médico si:  Los síntomas no mejoran con los medicamentos.  Le duelen las articulaciones o estas se inflaman.  Tiene fiebre.  Tiene dolor en el abdomen.  Solicite ayuda de inmediato si:  Se le hinchan la lengua, los labios o los párpados.  Siente el pecho o la garganta cerrados.  Tiene problemas para respirar o tragar.  Estos síntomas pueden indicar tosha emergencia. Use el lápiz autoinyector de inmediato. Luego llame al 911.  No espere a vitaliy si los síntomas desaparecen.  No conduzca por cuong propios medios hasta el hospital.  Esta información no tiene lindsay fin reemplazar el consejo del médico. Asegúrese de hacerle al médico cualquier pregunta que tenga.    Document Revised: 10/19/2023 Document Reviewed: 10/19/2023  [employer] Patient Education © 2025 [employer]  [employer] logo  Terms and Conditions  Privacy Policy  Editorial Policy  All content on this site: Copyright © 2025 [employer], its licensors, and contributors. All rights are reserved, including those for text and data mining, AI training, and similar technologies. For all open access content, the Creative Commons licensing terms apply.  Cookies are used by this site. To decline or learn more, visit our Cookies page.

## 2025-03-31 NOTE — ED STATDOCS - OBJECTIVE STATEMENT
49-year-old female with a breast medical history of thyroid cancer status post thyroidectomy, anemia, fibroids, who presents with chief complaint of hives.  Symptoms started on Saturday.  Complains of generalized hives, with itching, burning sensation as well.  She denies any oral swelling, difficulty breathing, or any other symptoms.  She denies any known allergies, new detergents, soaps, clothing, or other environmental changes.  She took a Benadryl this morning, nothing since then.  She has not followed up with her doctor yet.  No other concerns at this time.

## 2025-03-31 NOTE — ED ADULT TRIAGE NOTE - CHIEF COMPLAINT QUOTE
pt ambulatory to ed c/o hives. pt states hives are on her "whole body". onset of hives 2 days ago. endorsing itchiness. states "my back feels very hot from them". pt has no known allergies. no chest pain, sob, n/v/d. no visible [id number] in triage.

## 2025-03-31 NOTE — ED ADULT NURSE NOTE - CHIEF COMPLAINT QUOTE
pt ambulatory to ed c/o hives. pt states hives are on her "whole body". onset of hives 2 days ago. endorsing itchiness. states "my back feels very hot from them". pt has no known allergies. no chest pain, sob, n/v/d. no visible [id number] in triage.

## 2025-03-31 NOTE — ED STATDOCS - PATIENT PORTAL LINK FT
You can access the FollowMyHealth Patient Portal offered by [facility] by registering at the following website: [email or web address]. By joining [employer]’s FollowMyHealth portal, you will also be able to view your health information using other applications (apps) compatible with our system.

## 2025-04-24 ENCOUNTER — APPOINTMENT (OUTPATIENT)
Dept: ENDOCRINOLOGY | Facility: HOSPITAL | Age: 50
End: 2025-04-24

## 2025-05-04 NOTE — ED ADULT NURSE NOTE - NSFALLRISKFACTORS_ED_ALL_ED
Arrives via EMS from [facility], complains of shortness of breath, pt wears 4L O2 baseline, was found by staff with O2 in the 70s, was placed on 8L high flow with improvement to 90-95%.     Has hx of FELICITAS, COPD, respiratory failure, CHF  
No indicators present

## 2025-06-15 ENCOUNTER — EMERGENCY (EMERGENCY)
Facility: HOSPITAL | Age: 50
LOS: 0 days | Discharge: HOME | End: 2025-06-15
Attending: STUDENT IN AN ORGANIZED HEALTH CARE EDUCATION/TRAINING PROGRAM
Payer: COMMERCIAL

## 2025-06-15 VITALS
TEMPERATURE: 98 F | DIASTOLIC BLOOD PRESSURE: 67 MMHG | HEART RATE: 75 BPM | HEIGHT: 64 IN | RESPIRATION RATE: 18 BRPM | WEIGHT: 195.33 LBS | SYSTOLIC BLOOD PRESSURE: 136 MMHG | OXYGEN SATURATION: 98 %

## 2025-06-15 DIAGNOSIS — R73.03 PREDIABETES: ICD-10-CM

## 2025-06-15 DIAGNOSIS — Z98.891 HISTORY OF UTERINE SCAR FROM PREVIOUS SURGERY: Chronic | ICD-10-CM

## 2025-06-15 DIAGNOSIS — M54.50 LOW BACK PAIN, UNSPECIFIED: ICD-10-CM

## 2025-06-15 DIAGNOSIS — Z90.49 ACQUIRED ABSENCE OF OTHER SPECIFIED PARTS OF DIGESTIVE TRACT: Chronic | ICD-10-CM

## 2025-06-15 DIAGNOSIS — Z98.890 OTHER SPECIFIED POSTPROCEDURAL STATES: Chronic | ICD-10-CM

## 2025-06-15 DIAGNOSIS — E89.0 POSTPROCEDURAL HYPOTHYROIDISM: Chronic | ICD-10-CM

## 2025-06-15 PROCEDURE — 99283 EMERGENCY DEPT VISIT LOW MDM: CPT

## 2025-06-15 PROCEDURE — 99284 EMERGENCY DEPT VISIT MOD MDM: CPT

## 2025-06-15 RX ORDER — ACETAMINOPHEN 500 MG/5ML
2 LIQUID (ML) ORAL
Qty: 24 | Refills: 0
Start: 2025-06-15 | End: 2025-06-17

## 2025-06-15 RX ORDER — IBUPROFEN 200 MG
1 TABLET ORAL
Qty: 16 | Refills: 0
Start: 2025-06-15 | End: 2025-06-18

## 2025-06-15 RX ORDER — IBUPROFEN 200 MG
600 TABLET ORAL ONCE
Refills: 0 | Status: COMPLETED | OUTPATIENT
Start: 2025-06-15 | End: 2025-06-15

## 2025-06-15 RX ORDER — ACETAMINOPHEN 500 MG/5ML
975 LIQUID (ML) ORAL ONCE
Refills: 0 | Status: COMPLETED | OUTPATIENT
Start: 2025-06-15 | End: 2025-06-15

## 2025-06-15 RX ADMIN — Medication 975 MILLIGRAM(S): at 22:47

## 2025-06-15 RX ADMIN — Medication 600 MILLIGRAM(S): at 22:47

## 2025-06-15 NOTE — ED STATDOCS - PROGRESS NOTE DETAILS
DC with MOtrin, Tylenol to pharmacy.  Return fro any concerns.  Jammie Wilson PA-C  used, ID#[id number]. 50 y/o F with PMHx of fibroids, thyroid nodules s/p thyroidectomy, anemia, cholecystectomy, prediabetes presents to the ED c/o R lower back pain radiating down the side of her leg x8 days. Reports she is having difficulty standing up from sitting secondary to pain. Denies heavy lifting or recent falls, fevers. Pt has been taking Musflex for her pain, has not taken Tylenol or Ibuprofen. Pt follows at the [facility].   Jammie Wilson PA-C DC with Motrosendo Tylenol to pharmacy.  Return fro any concerns.  Follow up at [facility].  Jammie Wilson PA-C DC with Motrin, Tylenol to pharmacy.  Return fro any concerns.  Follow up at [facility].   # [id number]   Jammie Wilson PA-C

## 2025-06-15 NOTE — ED STATDOCS - PHYSICAL EXAMINATION
Constitutional: NAD, alert, verbal  HEENT: NCAT, EOMi, PERRL  Cardiac: RRR no MRG  Resp: clear, no wheezing or crackles  GI: ab soft ntnd, no r/g  MSK/Ext: no edema, +SLR on the right  Neuro: SOLIS  Skin: No rashes

## 2025-06-15 NOTE — ED STATDOCS - NSFOLLOWUPINSTRUCTIONS_ED_ALL_ED_FT
Dolor yeny de la parte inferior de la espalda    LO QUE NECESITA SABER:    ¿Qué es el dolor yeny de la región inferior de la espalda?El dolor yeny de la región lumbar de la espalda es tosha molestia repentina que dura hasta 6 semanas y que dificulta la actividad.    ¿Qué causa o aumenta mi riesgo de tener dolor yeny de la parte inferior de la espalda?Las condiciones que afectan la columna, las articulaciones, o los músculos pueden causar el dolor de espalda. Estos pueden incluir la artritis, estenosis de la belem dorsal (estrechamiento de la columna vertebral), tensión muscular o descomposición de los discos de la columna vertebral. Los siguientes aumentan banegas riesgo de tener dolor de espalda:    Inclinarse o levantar de tosha forma repetitiva o girar o levantar artículos pesados    Lesión debido a tosha caída o accidente    Falta de actividad física regular    Obesidad o embarazo    Fumar    Envejecimiento    Manejar, estar sentado o de pie por mucho tiempo    Aileen postura mientras está sentado o de pie  ¿Cómo se diagnostica la causa del dolor yeny de la parte inferior de la espalda?Baengas médico le preguntará acerca de banegas historial médico y lo examinará. Es posible que le pregunte cuándo tuvo dolor de la parte inferior de la espalda por última vez y cómo comenzó. Muéstrele dónde siente el dolor y qué lo mejora o lo empeora. Informe a banegas médico acerca del tipo de dolor que tiene, qué tan tarun es, y cuánto tiempo dura. Dígale si el dolor empeora por las noches o cuando se recuesta boca arriba.  Escala de dolor    ¿Cómo se trata el dolor lumbar yeny?El objetivo del tratamiento es aliviar el dolor y ayudarlo para que pueda realizar cuong actividades habituales. La mayoría de las personas que tienen dolor yeny de la parte inferior de la espalda se mejoran entre unas 4 a 6 semanas. Es posible que usted necesite alguno de los siguientes:    AINEcomo el ibuprofeno, ayudan a disminuir la inflamación, el dolor y la fiebre. Frank medicamento está disponible con o sin tosha receta médica. Los DEBRA pueden causar sangrado estomacal o problemas renales en ciertas personas. Si usted jessica un medicamento anticoagulante, siempre pregúntele a banegas médico si los DEBRA son seguros para usted. Siempre daily la etiqueta de frank medicamento y siga las instrucciones.    Acetaminofénalivia el dolor y baja la fiebre. Está disponible sin receta médica. Pregunte la cantidad y la frecuencia con que debe tomarlos. Siga las indicaciones. Daily las etiquetas de todos los demás medicamentos que esté usando para saber si también contienen acetaminofén, o pregunte a banegas médico o farmacéutico. El acetaminofén puede causar daño en el hígado cuando no se jessica de forma correcta.    Relajantes muscularesdisminuyen el dolor y relajan los músculos de la parte inferior de la columna.    Puede administrarsepodrían administrarse. Pregunte al médico cómo debe kit frank medicamento de forma wilkes. Algunos medicamentos recetados para el dolor contienen acetaminofén. No tome otros medicamentos que contengan acetaminofén sin consultarlo con banegas médico. Demasiado acetaminofeno puede causar daño al hígado. Los medicamentos recetados para el dolor podrían causar estreñimiento. Pregunte a banegas médico perla prevenir o tratar estreñimiento.  ¿Qué puedo hacer para prevenir el dolor yeny de la parte inferior de la espalda?    Use la mecánica corporal adecuada.  Flexione la cadera y las rodillas cuando vaya a levantar un objeto. No doble la cintura. Utilice los músculos de las piernas mientras levanta banegas carga. No use banegas espalda. Mantenga el objeto cerca de banegas pecho mientras lo levanta. No se tuerza, ni levante cualquier cosa por encima de banegas cintura.  Cómo levantar objetos de forma wilkes      Cambie banegas posición frecuentemente cuando pase mucho tiempo de pie. Descanse un pie sobre tosha caja pequeña o un reposapiés e intercambie con el otro pie frecuentemente.    No permanezca sentado por lapsos de tiempo prolongados. Cuando sea necesario hacerlo, siéntese en tosha silla de respaldo recto con los pies apoyados en el suelo. Nunca alcance, jale ni empuje mientras se encuentra sentando.    Khadra ejercicios que fortalezcan cuong músculos de la espalda.Entre en calor antes de hacer ejercicio. Consulte con banegas médico sobre el mejor plan de ejercicios para usted.  Calentamiento y enfriamiento      Mantenga un peso saludable.Pregúntele a banegas médico cuál es el peso ideal para usted. Pídale que lo ayude a crear un plan para bajar de peso si tiene sobrepeso.  ¿Cómo puedo cuidarme si tengo dolor yeny de la parte inferior de la espalda?    Manténgase activolo más que pueda sin causar más dolor. El reposo en cama puede empeorar banegas dolor de espalda. Comience con ejercicios ligeros, perla caminar. Evite levantar objetos hasta que ya no tenga dolor. Solicite más información acerca de las actividades físicas o plan de ejercicios que son los adecuados para usted.  CARLEEN ASIÁTICA CAMINANDO PERLA EJERCICIO      Aplique caloren la espalda de 20 a 30 minutos cada 2 horas por los días que le indiquen. El calor ayuda a disminuir el dolor y los espasmos musculares. Alterne entre el calor y el hielo.    Aplique hieloen la espalda de 15 a 20 minutos cada hora o perla se le indique. Use tosha compresa de hielo o ponga hielo triturado en tosha bolsa de plástico. Cúbralo con tosha toalla antes de aplicarlo sobre banegas piel. El hielo ayuda a evitar daño al tejido y a disminuir la inflamación y el dolor.  ¿Cuándo miesha buscar atención inmediata?    Usted tiene dolor intenso.    Tiene rigidez repentina y sensación de pesadez en ambas piernas.    Usted tiene entumecimiento o debilidad en tosha pierna o dolor en ambas piernas.    Usted tiene entumecimiento en el área genital o en la región lumbar.    Usted no puede controlar banegas orina ni cuong deposiciones intestinales.  ¿Cuándo miesha llamar a mi médico?    Tiene fiebre.    Usted tiene un dolor por la noche o cuando descansa.    Banegas dolor no mejora con el tratamiento.    Usted tiene dolor que empeora cuando tose o estornuda.    Usted siente un estallido o chasquido repentino en banegas espalda.    Usted tiene preguntas o inquietudes acerca de banegas condición o cuidado.  ACUERDOS SOBRE BANEGAS CUIDADO:    Usted tiene el derecho de ayudar a planear banegas cuidado. Aprenda todo lo que pueda sobre banegas condición y perla darle tratamiento. Discuta cuong opciones de tratamiento con cuong médicos para decidir el cuidado que usted desea recibir. Usted siempre tiene el derecho de rechazar el tratamiento.

## 2025-06-15 NOTE — ED STATDOCS - CLINICAL SUMMARY MEDICAL DECISION MAKING FREE TEXT BOX
used, ID#[id number]. 48 y/o F with PMHx of fibroids, thyroid nodules s/p thyroidectomy, anemia, cholecystectomy, prediabetes presents to the ED c/o R lower back pain radiating down the side of her leg x8 days. Reports she is having difficulty standing up from sitting secondary to pain. Denies heavy lifting or recent falls, fevers. Pt has been taking Musflex for her pain, has not taken Tylenol or Ibuprofen. Pt follows at the [facility].    Pain meds, dc.  used, ID#[id number].     48 y/o F with PMHx of fibroids, thyroid nodules s/p thyroidectomy, anemia, cholecystectomy, prediabetes presents to the ED c/o R lower back pain radiating down the side of her leg x8 days. Reports she is having difficulty standing up from sitting secondary to pain. Denies heavy lifting or recent falls, fevers. Pt has been taking Musflex for her pain, has not taken Tylenol or Ibuprofen.   Denies incontinence/retention, saddle anesthesia, IVDU, malignancy, fevers.    Pt follows at the [facility].    On exam well-appearing, ambulatory, sensation intact.     likely radiculopathy vs MSK    pain control, reassess

## 2025-06-15 NOTE — ED STATDOCS - PATIENT PORTAL LINK FT
You can access the FollowMyHealth Patient Portal offered by [facility] by registering at the following website: [email or web address]. By joining [employer]’s FollowMyHealth portal, you will also be able to view your health information using other applications (apps) compatible with our system.

## 2025-06-15 NOTE — ED ADULT NURSE NOTE - OBJECTIVE STATEMENT
Pt is a 48 yo female who presents to the ED c/o back pain. Pt AOX4 and ambulatory. Pt medicated for pain and discharged. Pt ambulated with steady gait, breathing equally and un labored. Safety and comfort maintained.

## 2025-06-15 NOTE — ED ADULT TRIAGE NOTE - CHIEF COMPLAINT QUOTE
Pt ambulatory to ED c/o R leg pain starting x8 days ago. Pt endorsing "she thinks this is a  nerve pain". Pt reports taking Musflex for pain relief with partial relief. Respirations even and unlabored, no distress noted.

## 2025-06-20 ENCOUNTER — OUTPATIENT (OUTPATIENT)
Dept: OUTPATIENT SERVICES | Facility: HOSPITAL | Age: 50
LOS: 1 days | End: 2025-06-20
Payer: COMMERCIAL

## 2025-06-20 ENCOUNTER — APPOINTMENT (OUTPATIENT)
Dept: RADIOLOGY | Facility: CLINIC | Age: 50
End: 2025-06-20
Payer: COMMERCIAL

## 2025-06-20 DIAGNOSIS — E89.0 POSTPROCEDURAL HYPOTHYROIDISM: Chronic | ICD-10-CM

## 2025-06-20 DIAGNOSIS — Z98.890 OTHER SPECIFIED POSTPROCEDURAL STATES: Chronic | ICD-10-CM

## 2025-06-20 DIAGNOSIS — Z98.891 HISTORY OF UTERINE SCAR FROM PREVIOUS SURGERY: Chronic | ICD-10-CM

## 2025-06-20 DIAGNOSIS — M54.50 LOW BACK PAIN, UNSPECIFIED: ICD-10-CM

## 2025-06-20 PROCEDURE — 73502 X-RAY EXAM HIP UNI 2-3 VIEWS: CPT

## 2025-06-20 PROCEDURE — 72110 X-RAY EXAM L-2 SPINE 4/>VWS: CPT | Mod: 26

## 2025-06-20 PROCEDURE — 73502 X-RAY EXAM HIP UNI 2-3 VIEWS: CPT | Mod: 26,RT

## 2025-06-20 PROCEDURE — 72110 X-RAY EXAM L-2 SPINE 4/>VWS: CPT

## 2025-07-02 ENCOUNTER — OUTPATIENT (OUTPATIENT)
Dept: OUTPATIENT SERVICES | Facility: HOSPITAL | Age: 50
LOS: 1 days | End: 2025-07-02
Payer: SELF-PAY

## 2025-07-02 ENCOUNTER — APPOINTMENT (OUTPATIENT)
Dept: RADIOLOGY | Facility: CLINIC | Age: 50
End: 2025-07-02
Payer: SELF-PAY

## 2025-07-02 DIAGNOSIS — Z90.49 ACQUIRED ABSENCE OF OTHER SPECIFIED PARTS OF DIGESTIVE TRACT: Chronic | ICD-10-CM

## 2025-07-02 DIAGNOSIS — E89.0 POSTPROCEDURAL HYPOTHYROIDISM: Chronic | ICD-10-CM

## 2025-07-02 DIAGNOSIS — Z98.891 HISTORY OF UTERINE SCAR FROM PREVIOUS SURGERY: Chronic | ICD-10-CM

## 2025-07-02 DIAGNOSIS — Z98.890 OTHER SPECIFIED POSTPROCEDURAL STATES: Chronic | ICD-10-CM

## 2025-07-02 PROCEDURE — 73030 X-RAY EXAM OF SHOULDER: CPT | Mod: 26,RT

## 2025-07-02 PROCEDURE — 73030 X-RAY EXAM OF SHOULDER: CPT

## 2025-07-18 NOTE — ED STATDOCS - WR ORDER NAME 1
Xray Chest 2 Views PA/Lat Price (Do Not Change): 0.00 Detail Level: Simple Instructions: This plan will send the code FBSD to the PM system.  DO NOT or CHANGE the price.

## 2025-07-29 ENCOUNTER — APPOINTMENT (OUTPATIENT)
Dept: CARDIOLOGY | Facility: HOSPITAL | Age: 50
End: 2025-07-29

## (undated) DEVICE — PACK HEAD & NECK

## (undated) DEVICE — WRAP COMPRESSION CALF MED

## (undated) DEVICE — DRSG STERISTRIPS 0.25X3"

## (undated) DEVICE — DRAIN JACKSON PRATT 7FR ROUND END NO TROCAR

## (undated) DEVICE — DRSG 3M BENZOIN 0.6CC

## (undated) DEVICE — SPONGE RAYTEC 4X4 16PLY

## (undated) DEVICE — DRSG BIOPATCH DISK W CHG 1" W 7.0MM HOLE

## (undated) DEVICE — LONE START RETRACTOR RING 12MM BLUNT DISP

## (undated) DEVICE — DRAPE MAGNETIC INSTRUMENT MEDIUM

## (undated) DEVICE — ELCTR PENCIL NEPTUNE SMOKE EVACUATION

## (undated) DEVICE — LABEL MED BLANK W PEN

## (undated) DEVICE — PIN DISPENSING SPIKE MINI

## (undated) DEVICE — SOL IRR POUR H2O 500ML

## (undated) DEVICE — STAPLER SKIN VISI-STAT 35 WIDE

## (undated) DEVICE — DRAPE INSTRUMENT POUCH

## (undated) DEVICE — FORCEP BIPOLAR NON STICK 4" W 12FT CORD DISP

## (undated) DEVICE — SYR LUER LOK 5CC

## (undated) DEVICE — DRAIN RESERVOIR FOR JACKSON PRATT 100CC CARDINAL

## (undated) DEVICE — SUT VICRYL 4-0 27" RB-1 UNDYED

## (undated) DEVICE — DRAPE SPLIT SHEETS 77X120"

## (undated) DEVICE — BLANKET WARMER LOWER ADULT

## (undated) DEVICE — SUT SILK 2-0 18" FS

## (undated) DEVICE — DRAPE TOP SHEET 53"X101"

## (undated) DEVICE — NERVE STIMULATOR/LOCATOR HEAD AND NECK MODEL

## (undated) DEVICE — FRAZIER SUCTION TIP 8FR

## (undated) DEVICE — DRAPE TOWEL BLUE 17" X 24"

## (undated) DEVICE — DRSG TEGADERM 4X4.75"

## (undated) DEVICE — PROBE STIMULATOR MONOPOLAR FLUSH TIP

## (undated) DEVICE — SUT SILK 3-0 18" TIES

## (undated) DEVICE — SUT MONOSOF 6-0 18" P-10

## (undated) DEVICE — ELCTR GROUNDING PAD ADULT COVIDIEN

## (undated) DEVICE — SPONGE PEANUT AUTO COUNT

## (undated) DEVICE — NDL HYPO SAFE 22G X 1"

## (undated) DEVICE — DRSG 4X4

## (undated) DEVICE — SUT SILK 2-0 18" TIES

## (undated) DEVICE — DRAPE 3/4 SHEET 52X76"

## (undated) DEVICE — SUT MONOCRYL 4-0 18" P-3 UNDYED

## (undated) DEVICE — PREP BETADINE SPONGE STICKS

## (undated) DEVICE — GLV 7 PROTEXIS

## (undated) DEVICE — DRAPE FLUID WARMER 44X44"

## (undated) DEVICE — SAFETY PIN